# Patient Record
Sex: FEMALE | Race: WHITE | NOT HISPANIC OR LATINO | Employment: FULL TIME | URBAN - METROPOLITAN AREA
[De-identification: names, ages, dates, MRNs, and addresses within clinical notes are randomized per-mention and may not be internally consistent; named-entity substitution may affect disease eponyms.]

---

## 2023-04-27 ENCOUNTER — PATIENT OUTREACH (OUTPATIENT)
Dept: CARE COORDINATION | Facility: CLINIC | Age: 63
End: 2023-04-27
Payer: COMMERCIAL

## 2023-05-26 ENCOUNTER — DOCUMENTATION (OUTPATIENT)
Dept: CARE COORDINATION | Facility: CLINIC | Age: 63
End: 2023-05-26
Payer: COMMERCIAL

## 2023-05-26 LAB
ALANINE AMINOTRANSFERASE (SGPT) (U/L) IN SER/PLAS: 18 U/L (ref 7–45)
ALBUMIN (G/DL) IN SER/PLAS: 4.3 G/DL (ref 3.4–5)
ALKALINE PHOSPHATASE (U/L) IN SER/PLAS: 69 U/L (ref 33–136)
ANION GAP IN SER/PLAS: 12 MMOL/L (ref 10–20)
ASPARTATE AMINOTRANSFERASE (SGOT) (U/L) IN SER/PLAS: 21 U/L (ref 9–39)
BASOPHILS (10*3/UL) IN BLOOD BY AUTOMATED COUNT: 0.03 X10E9/L (ref 0–0.1)
BASOPHILS/100 LEUKOCYTES IN BLOOD BY AUTOMATED COUNT: 0.6 % (ref 0–2)
BILIRUBIN TOTAL (MG/DL) IN SER/PLAS: 0.7 MG/DL (ref 0–1.2)
CALCIUM (MG/DL) IN SER/PLAS: 9.3 MG/DL (ref 8.6–10.3)
CARBON DIOXIDE, TOTAL (MMOL/L) IN SER/PLAS: 28 MMOL/L (ref 21–32)
CHLORIDE (MMOL/L) IN SER/PLAS: 105 MMOL/L (ref 98–107)
CHOLESTEROL (MG/DL) IN SER/PLAS: 197 MG/DL (ref 0–199)
CHOLESTEROL IN HDL (MG/DL) IN SER/PLAS: 48.5 MG/DL
CHOLESTEROL/HDL RATIO: 4.1
COBALAMIN (VITAMIN B12) (PG/ML) IN SER/PLAS: 818 PG/ML (ref 211–911)
CREATININE (MG/DL) IN SER/PLAS: 0.78 MG/DL (ref 0.5–1.05)
EOSINOPHILS (10*3/UL) IN BLOOD BY AUTOMATED COUNT: 0.28 X10E9/L (ref 0–0.7)
EOSINOPHILS/100 LEUKOCYTES IN BLOOD BY AUTOMATED COUNT: 5.7 % (ref 0–6)
ERYTHROCYTE DISTRIBUTION WIDTH (RATIO) BY AUTOMATED COUNT: 15.8 % (ref 11.5–14.5)
ERYTHROCYTE MEAN CORPUSCULAR HEMOGLOBIN CONCENTRATION (G/DL) BY AUTOMATED: 31.7 G/DL (ref 32–36)
ERYTHROCYTE MEAN CORPUSCULAR VOLUME (FL) BY AUTOMATED COUNT: 94 FL (ref 80–100)
ERYTHROCYTES (10*6/UL) IN BLOOD BY AUTOMATED COUNT: 3.95 X10E12/L (ref 4–5.2)
FIBRIN D-DIMER (NG/ML FEU) IN PLATELET POOR PLASMA: 416 NG/ML FEU
GFR FEMALE: 87 ML/MIN/1.73M2
GLUCOSE (MG/DL) IN SER/PLAS: 96 MG/DL (ref 74–99)
HEMATOCRIT (%) IN BLOOD BY AUTOMATED COUNT: 37.2 % (ref 36–46)
HEMOGLOBIN (G/DL) IN BLOOD: 11.8 G/DL (ref 12–16)
IMMATURE GRANULOCYTES/100 LEUKOCYTES IN BLOOD BY AUTOMATED COUNT: 0.2 % (ref 0–0.9)
LDL: 127 MG/DL (ref 0–99)
LEUKOCYTES (10*3/UL) IN BLOOD BY AUTOMATED COUNT: 5 X10E9/L (ref 4.4–11.3)
LYMPHOCYTES (10*3/UL) IN BLOOD BY AUTOMATED COUNT: 1.34 X10E9/L (ref 1.2–4.8)
LYMPHOCYTES/100 LEUKOCYTES IN BLOOD BY AUTOMATED COUNT: 27.1 % (ref 13–44)
MONOCYTES (10*3/UL) IN BLOOD BY AUTOMATED COUNT: 0.33 X10E9/L (ref 0.1–1)
MONOCYTES/100 LEUKOCYTES IN BLOOD BY AUTOMATED COUNT: 6.7 % (ref 2–10)
NEUTROPHILS (10*3/UL) IN BLOOD BY AUTOMATED COUNT: 2.96 X10E9/L (ref 1.2–7.7)
NEUTROPHILS/100 LEUKOCYTES IN BLOOD BY AUTOMATED COUNT: 59.7 % (ref 40–80)
PLATELETS (10*3/UL) IN BLOOD AUTOMATED COUNT: 152 X10E9/L (ref 150–450)
POTASSIUM (MMOL/L) IN SER/PLAS: 3.7 MMOL/L (ref 3.5–5.3)
PROTEIN TOTAL: 7.2 G/DL (ref 6.4–8.2)
SODIUM (MMOL/L) IN SER/PLAS: 141 MMOL/L (ref 136–145)
THYROTROPIN (MIU/L) IN SER/PLAS BY DETECTION LIMIT <= 0.05 MIU/L: 2.25 MIU/L (ref 0.44–3.98)
TRIGLYCERIDE (MG/DL) IN SER/PLAS: 107 MG/DL (ref 0–149)
URATE (MG/DL) IN SER/PLAS: 3.8 MG/DL (ref 2.3–6.7)
UREA NITROGEN (MG/DL) IN SER/PLAS: 23 MG/DL (ref 6–23)
VLDL: 21 MG/DL (ref 0–40)

## 2023-06-24 LAB
APPEARANCE, URINE: NORMAL
ASCORBIC ACID: NORMAL MG/DL
BILIRUBIN, URINE: NORMAL
BLOOD, URINE: NORMAL
COLOR, URINE: NORMAL
GLUCOSE, URINE: NORMAL
KETONES, URINE: NORMAL
LEUKOCYTE ESTERASE, URINE: NORMAL
NITRITE, URINE: NORMAL
PH, URINE: NORMAL
PROTEIN, URINE: NORMAL
SPECIFIC GRAVITY, URINE: NORMAL
UROBILINOGEN, URINE: NORMAL

## 2023-06-25 LAB — URINE CULTURE: ABNORMAL

## 2023-06-26 ENCOUNTER — PATIENT OUTREACH (OUTPATIENT)
Dept: CARE COORDINATION | Facility: CLINIC | Age: 63
End: 2023-06-26
Payer: COMMERCIAL

## 2023-06-26 NOTE — PROGRESS NOTES
Call placed and VMM left.  Purpose of call was to follow up following initial assessment,  Unable to determine if Melany has followed up with her PCP or Cardiologist due to providers not on the EMR or AEMR platforms

## 2023-07-01 LAB — URINE CULTURE: ABNORMAL

## 2023-08-17 ENCOUNTER — HOSPITAL ENCOUNTER (OUTPATIENT)
Dept: DATA CONVERSION | Facility: HOSPITAL | Age: 63
Discharge: HOME | End: 2023-08-17
Payer: COMMERCIAL

## 2023-08-17 DIAGNOSIS — T78.40XA ALLERGY, UNSPECIFIED, INITIAL ENCOUNTER: ICD-10-CM

## 2023-08-17 DIAGNOSIS — L50.9 URTICARIA, UNSPECIFIED: ICD-10-CM

## 2023-08-17 DIAGNOSIS — T36.0X5A ADVERSE EFFECT OF PENICILLINS, INITIAL ENCOUNTER: ICD-10-CM

## 2023-09-05 ENCOUNTER — HOSPITAL ENCOUNTER (OUTPATIENT)
Dept: DATA CONVERSION | Facility: HOSPITAL | Age: 63
Discharge: HOME | End: 2023-09-05
Payer: COMMERCIAL

## 2023-09-05 DIAGNOSIS — N20.0 CALCULUS OF KIDNEY: ICD-10-CM

## 2023-10-19 ENCOUNTER — PHARMACY VISIT (OUTPATIENT)
Dept: PHARMACY | Facility: CLINIC | Age: 63
End: 2023-10-19
Payer: COMMERCIAL

## 2023-10-19 PROCEDURE — RXMED WILLOW AMBULATORY MEDICATION CHARGE

## 2023-10-19 RX ORDER — CLINDAMYCIN HYDROCHLORIDE 150 MG/1
150 CAPSULE ORAL
Qty: 8 CAPSULE | Refills: 2 | OUTPATIENT
Start: 2023-10-19 | End: 2023-11-28 | Stop reason: WASHOUT

## 2023-11-03 ENCOUNTER — TELEPHONE (OUTPATIENT)
Dept: PRIMARY CARE | Facility: CLINIC | Age: 63
End: 2023-11-03
Payer: COMMERCIAL

## 2023-11-03 DIAGNOSIS — F51.01 PRIMARY INSOMNIA: ICD-10-CM

## 2023-11-03 DIAGNOSIS — R11.0 NAUSEA: Primary | ICD-10-CM

## 2023-11-03 RX ORDER — ZOLPIDEM TARTRATE 10 MG/1
10 TABLET ORAL NIGHTLY PRN
Qty: 30 TABLET | Refills: 2 | Status: SHIPPED | OUTPATIENT
Start: 2023-11-03

## 2023-11-03 RX ORDER — ZOLPIDEM TARTRATE 10 MG/1
10 TABLET ORAL NIGHTLY PRN
COMMUNITY
Start: 2021-09-04 | End: 2023-11-03 | Stop reason: SDUPTHER

## 2023-11-03 RX ORDER — GLIMEPIRIDE 1 MG/1
1 TABLET ORAL
COMMUNITY
Start: 2021-03-04 | End: 2023-11-28 | Stop reason: WASHOUT

## 2023-11-03 RX ORDER — PROCHLORPERAZINE MALEATE 10 MG
10 TABLET ORAL EVERY 6 HOURS PRN
Qty: 30 TABLET | Refills: 5 | Status: SHIPPED | OUTPATIENT
Start: 2023-11-03

## 2023-11-03 NOTE — TELEPHONE ENCOUNTER
Pt called requesting refill Ambien and prochlorperazine ([facility]).  Pt last appt 4/4

## 2023-11-28 ENCOUNTER — OFFICE VISIT (OUTPATIENT)
Dept: PRIMARY CARE | Facility: CLINIC | Age: 63
End: 2023-11-28
Payer: COMMERCIAL

## 2023-11-28 VITALS
HEIGHT: 68 IN | HEART RATE: 55 BPM | DIASTOLIC BLOOD PRESSURE: 82 MMHG | BODY MASS INDEX: 34.1 KG/M2 | SYSTOLIC BLOOD PRESSURE: 112 MMHG | WEIGHT: 225 LBS

## 2023-11-28 DIAGNOSIS — R41.3 MEMORY LOSS: Primary | ICD-10-CM

## 2023-11-28 DIAGNOSIS — E11.9 TYPE 2 DIABETES MELLITUS WITHOUT COMPLICATION, UNSPECIFIED WHETHER LONG TERM INSULIN USE (MULTI): ICD-10-CM

## 2023-11-28 DIAGNOSIS — E55.9 VITAMIN D DEFICIENCY: ICD-10-CM

## 2023-11-28 PROBLEM — F41.9 ANXIETY: Status: ACTIVE | Noted: 2023-11-28

## 2023-11-28 PROBLEM — R30.0 DYSURIA: Status: RESOLVED | Noted: 2023-11-28 | Resolved: 2023-11-28

## 2023-11-28 PROBLEM — N39.0 ACUTE URINARY TRACT INFECTION: Status: RESOLVED | Noted: 2023-11-28 | Resolved: 2023-11-28

## 2023-11-28 PROBLEM — G47.10 HYPERSOMNOLENCE: Status: ACTIVE | Noted: 2023-11-28

## 2023-11-28 PROBLEM — R16.1 SPLENOMEGALY: Status: ACTIVE | Noted: 2023-11-28

## 2023-11-28 PROBLEM — D64.9 ANEMIA: Status: ACTIVE | Noted: 2023-11-28

## 2023-11-28 PROBLEM — R74.01 ELEVATION OF LEVEL OF TRANSAMINASE AND LACTIC ACID DEHYDROGENASE (LDH): Status: ACTIVE | Noted: 2023-11-28

## 2023-11-28 PROBLEM — K74.60 CIRRHOSIS (MULTI): Status: ACTIVE | Noted: 2023-11-28

## 2023-11-28 PROBLEM — M75.100 ROTATOR CUFF TEAR: Status: RESOLVED | Noted: 2023-11-28 | Resolved: 2023-11-28

## 2023-11-28 PROBLEM — R63.0 LOSS OF APPETITE: Status: RESOLVED | Noted: 2023-11-28 | Resolved: 2023-11-28

## 2023-11-28 PROBLEM — I47.10 SVT (SUPRAVENTRICULAR TACHYCARDIA) (CMS-HCC): Status: ACTIVE | Noted: 2023-11-28

## 2023-11-28 PROBLEM — G89.29 OTHER CHRONIC PAIN: Status: ACTIVE | Noted: 2023-11-28

## 2023-11-28 PROBLEM — R87.810 CERVICAL HIGH RISK HUMAN PAPILLOMAVIRUS (HPV) DNA TEST POSITIVE: Status: ACTIVE | Noted: 2023-11-28

## 2023-11-28 PROBLEM — K21.9 GERD (GASTROESOPHAGEAL REFLUX DISEASE): Status: ACTIVE | Noted: 2023-11-28

## 2023-11-28 PROBLEM — T78.40XA ALLERGIC REACTION: Status: RESOLVED | Noted: 2023-11-28 | Resolved: 2023-11-28

## 2023-11-28 PROBLEM — F51.04 CHRONIC INSOMNIA: Status: ACTIVE | Noted: 2023-11-28

## 2023-11-28 PROBLEM — E78.5 HYPERLIPIDEMIA: Status: ACTIVE | Noted: 2023-11-28

## 2023-11-28 PROBLEM — I48.91 RAPID ATRIAL FIBRILLATION (MULTI): Status: ACTIVE | Noted: 2023-11-28

## 2023-11-28 PROBLEM — G47.26 CIRCADIAN RHYTHM SLEEP DISORDER, SHIFT WORK TYPE: Status: ACTIVE | Noted: 2023-11-28

## 2023-11-28 PROBLEM — R50.9 FEVER: Status: RESOLVED | Noted: 2023-11-28 | Resolved: 2023-11-28

## 2023-11-28 PROBLEM — Z99.89 DEPENDENCE ON OTHER ENABLING MACHINES AND DEVICES: Status: ACTIVE | Noted: 2023-11-28

## 2023-11-28 PROBLEM — T50.905A ADVERSE REACTION TO DRUG: Status: RESOLVED | Noted: 2023-11-28 | Resolved: 2023-11-28

## 2023-11-28 PROBLEM — R74.02 ELEVATION OF LEVEL OF TRANSAMINASE AND LACTIC ACID DEHYDROGENASE (LDH): Status: ACTIVE | Noted: 2023-11-28

## 2023-11-28 PROBLEM — E11.319 DIABETIC RETINOPATHY (MULTI): Status: ACTIVE | Noted: 2023-11-28

## 2023-11-28 PROBLEM — K76.0 FATTY LIVER: Status: ACTIVE | Noted: 2023-11-28

## 2023-11-28 PROBLEM — D50.9 IRON DEFICIENCY ANEMIA: Status: ACTIVE | Noted: 2023-11-28

## 2023-11-28 PROBLEM — R74.8 ELEVATED LIVER ENZYMES: Status: ACTIVE | Noted: 2023-11-28

## 2023-11-28 PROBLEM — R26.89 IMPAIRMENT OF BALANCE: Status: ACTIVE | Noted: 2023-11-28

## 2023-11-28 PROBLEM — M06.09 POLYARTHRITIS WITH NEGATIVE RHEUMATOID FACTOR (MULTI): Status: ACTIVE | Noted: 2023-11-28

## 2023-11-28 PROBLEM — E11.21 DIABETIC NEPHROPATHY (MULTI): Status: ACTIVE | Noted: 2023-11-28

## 2023-11-28 PROBLEM — F51.12 BEHAVIORALLY INDUCED INSUFFICIENT SLEEP SYNDROME: Status: ACTIVE | Noted: 2023-11-28

## 2023-11-28 PROBLEM — I10 HYPERTENSION: Status: ACTIVE | Noted: 2023-11-28

## 2023-11-28 PROBLEM — G47.33 OBSTRUCTIVE SLEEP APNEA (ADULT) (PEDIATRIC): Status: ACTIVE | Noted: 2023-11-28

## 2023-11-28 PROBLEM — R00.2 PALPITATIONS: Status: ACTIVE | Noted: 2023-11-28

## 2023-11-28 PROBLEM — M54.32 SCIATICA OF LEFT SIDE: Status: RESOLVED | Noted: 2023-11-28 | Resolved: 2023-11-28

## 2023-11-28 PROBLEM — G25.81 RESTLESS LEG SYNDROME: Status: ACTIVE | Noted: 2023-11-28

## 2023-11-28 PROBLEM — R10.9 ABDOMINAL PAIN: Status: RESOLVED | Noted: 2023-11-28 | Resolved: 2023-11-28

## 2023-11-28 PROBLEM — M1A.0710 CHRONIC GOUT OF RIGHT FOOT: Status: ACTIVE | Noted: 2023-11-28

## 2023-11-28 LAB — POC HEMOGLOBIN A1C: 6 % (ref 4.2–6.5)

## 2023-11-28 PROCEDURE — 3074F SYST BP LT 130 MM HG: CPT | Performed by: PHYSICIAN ASSISTANT

## 2023-11-28 PROCEDURE — 1036F TOBACCO NON-USER: CPT | Performed by: PHYSICIAN ASSISTANT

## 2023-11-28 PROCEDURE — 4010F ACE/ARB THERAPY RXD/TAKEN: CPT | Performed by: PHYSICIAN ASSISTANT

## 2023-11-28 PROCEDURE — 99203 OFFICE O/P NEW LOW 30 MIN: CPT | Performed by: PHYSICIAN ASSISTANT

## 2023-11-28 PROCEDURE — 3079F DIAST BP 80-89 MM HG: CPT | Performed by: PHYSICIAN ASSISTANT

## 2023-11-28 PROCEDURE — 3044F HG A1C LEVEL LT 7.0%: CPT | Performed by: PHYSICIAN ASSISTANT

## 2023-11-28 PROCEDURE — 83036 HEMOGLOBIN GLYCOSYLATED A1C: CPT | Performed by: PHYSICIAN ASSISTANT

## 2023-11-28 ASSESSMENT — PAIN SCALES - GENERAL: PAINLEVEL: 0-NO PAIN

## 2023-11-28 ASSESSMENT — ENCOUNTER SYMPTOMS: BACK PAIN: 1

## 2023-11-28 NOTE — PROGRESS NOTES
"Subjective   Patient ID: Melany Garcia is a 63 y.o. female who presents for Memory Loss (New patient. Discuss memory issues.) and Back Pain (Ongoing, left sided \"crampy\").  History of cirrhosis  Memory Loss    Patient reports onset of memory loss was more than 5 years ago. Onset quality is gradual.     Symptoms associated with memory loss include changes in short-term memory, changes in long-term memory and difficulty recalling words.     The family and/or patient does not have the following concerns associated with memory loss: cooking or preparing meals.  Back Pain  Pertinent negatives include no chest pain or numbness.    She has had it for years. Has not been evaluated for it for some time.She is a phlebotomist.    Review of Systems   Respiratory:  Negative for chest tightness, shortness of breath and wheezing.    Cardiovascular:  Negative for chest pain.   Musculoskeletal:  Positive for arthralgias, back pain and myalgias.   Neurological:  Negative for syncope, speech difficulty, light-headedness and numbness.   Psychiatric/Behavioral:  Positive for confusion and decreased concentration. The patient is not nervous/anxious.        Objective   /82   Pulse 55   Ht 1.727 m (5' 8\")   Wt 102 kg (225 lb)   BMI 34.21 kg/m²     Physical Exam  Constitutional:       Appearance: She is obese.   HENT:      Right Ear: Tympanic membrane normal.      Left Ear: Tympanic membrane normal.   Eyes:      Extraocular Movements: Extraocular movements intact.      Pupils: Pupils are equal, round, and reactive to light.   Cardiovascular:      Rate and Rhythm: Normal rate.      Pulses: Normal pulses.      Heart sounds: No murmur heard.  Pulmonary:      Effort: Pulmonary effort is normal.   Musculoskeletal:      Cervical back: Normal range of motion. No tenderness.   Skin:     General: Skin is warm.   Neurological:      General: No focal deficit present.      Mental Status: She is alert.      Cranial Nerves: No cranial nerve " deficit.      Sensory: No sensory deficit.      Motor: No weakness.      Coordination: Coordination normal.      Gait: Gait normal.      Deep Tendon Reflexes: Reflexes normal.   Psychiatric:         Mood and Affect: Mood normal.         Judgment: Judgment normal.         Assessment/Plan   Diagnoses and all orders for this visit:  Memory loss  -     CT head wo IV contrast; Future  Type 2 diabetes mellitus without complication, unspecified whether long term insulin use (CMS/[facility])  -     POCT glycosylated hemoglobin (Hb A1C) manually resulted  Vitamin D deficiency  -     Vitamin D 25-Hydroxy,Total (for eval of Vitamin D levels); Future  Other orders  -     Follow Up In Primary Care - Established; Future       Patient will follow-up for recheck of her test results.  May need to be referred to neurology.

## 2023-11-30 ASSESSMENT — ENCOUNTER SYMPTOMS
ARTHRALGIAS: 1
WHEEZING: 0
NUMBNESS: 0
LIGHT-HEADEDNESS: 0
MYALGIAS: 1
SPEECH DIFFICULTY: 0
CONFUSION: 1
CHEST TIGHTNESS: 0
NERVOUS/ANXIOUS: 0
DECREASED CONCENTRATION: 1
SHORTNESS OF BREATH: 0

## 2023-12-18 ENCOUNTER — HOSPITAL ENCOUNTER (OUTPATIENT)
Dept: RADIOLOGY | Facility: HOSPITAL | Age: 63
Discharge: HOME | End: 2023-12-18
Payer: COMMERCIAL

## 2023-12-18 DIAGNOSIS — R41.3 MEMORY LOSS: ICD-10-CM

## 2023-12-18 PROCEDURE — 70450 CT HEAD/BRAIN W/O DYE: CPT | Performed by: RADIOLOGY

## 2023-12-18 PROCEDURE — 70450 CT HEAD/BRAIN W/O DYE: CPT

## 2023-12-22 PROCEDURE — RXMED WILLOW AMBULATORY MEDICATION CHARGE

## 2024-01-02 ENCOUNTER — OFFICE VISIT (OUTPATIENT)
Dept: PRIMARY CARE | Facility: CLINIC | Age: 64
End: 2024-01-02
Payer: COMMERCIAL

## 2024-01-02 VITALS
WEIGHT: 225 LBS | BODY MASS INDEX: 34.21 KG/M2 | HEART RATE: 74 BPM | DIASTOLIC BLOOD PRESSURE: 72 MMHG | SYSTOLIC BLOOD PRESSURE: 108 MMHG

## 2024-01-02 DIAGNOSIS — Z12.31 ENCOUNTER FOR SCREENING MAMMOGRAM FOR MALIGNANT NEOPLASM OF BREAST: Primary | ICD-10-CM

## 2024-01-02 DIAGNOSIS — E55.9 VITAMIN D DEFICIENCY: ICD-10-CM

## 2024-01-02 DIAGNOSIS — R41.3 MEMORY LOSS: ICD-10-CM

## 2024-01-02 PROCEDURE — 3074F SYST BP LT 130 MM HG: CPT | Performed by: PHYSICIAN ASSISTANT

## 2024-01-02 PROCEDURE — 99213 OFFICE O/P EST LOW 20 MIN: CPT | Performed by: PHYSICIAN ASSISTANT

## 2024-01-02 PROCEDURE — 1036F TOBACCO NON-USER: CPT | Performed by: PHYSICIAN ASSISTANT

## 2024-01-02 PROCEDURE — 4010F ACE/ARB THERAPY RXD/TAKEN: CPT | Performed by: PHYSICIAN ASSISTANT

## 2024-01-02 PROCEDURE — 3078F DIAST BP <80 MM HG: CPT | Performed by: PHYSICIAN ASSISTANT

## 2024-01-02 ASSESSMENT — PAIN SCALES - GENERAL: PAINLEVEL: 0-NO PAIN

## 2024-01-02 NOTE — PROGRESS NOTES
Subjective   Patient ID: Melany Garcia is a 63 y.o. female who presents for Follow-up (CT scan. Discuss other issues such as hair loss, possible vitamin deficiency. ).    HPI   CT scan of the brain was negative.  She has not noticed any changes in memory or other episodes.Turns out her vitamin D has been low in the past.  She is taking vitamin D daily  Review of Systems   Constitutional:  Positive for fatigue.   Endocrine: Negative for cold intolerance and heat intolerance.   Musculoskeletal:  Positive for arthralgias and myalgias.   Psychiatric/Behavioral:  Negative for confusion.        Objective   /72   Pulse 74   Wt 102 kg (225 lb)   BMI 34.21 kg/m²     Physical Exam  Constitutional:       Appearance: Normal appearance.   Neurological:      General: No focal deficit present.      Mental Status: She is alert. Mental status is at baseline.   Psychiatric:         Mood and Affect: Mood normal.         Thought Content: Thought content normal.         Judgment: Judgment normal.         Assessment/Plan   Diagnoses and all orders for this visit:  Encounter for screening mammogram for malignant neoplasm of breast  -     BI mammo bilateral screening tomosynthesis; Future  -     BI mammo bilateral screening tomosynthesis; Future  Memory loss  Vitamin D deficiency  Other orders  -     Follow Up In Primary Care - Established  -     Follow Up In Primary Care - Health Maintenance; Future  Monitor for now.  Continue vitamin D rest of her vitamins.

## 2024-01-03 ENCOUNTER — ANCILLARY PROCEDURE (OUTPATIENT)
Dept: RADIOLOGY | Facility: CLINIC | Age: 64
End: 2024-01-03
Payer: COMMERCIAL

## 2024-01-03 VITALS — BODY MASS INDEX: 33.71 KG/M2 | HEIGHT: 69 IN

## 2024-01-03 DIAGNOSIS — Z12.31 ENCOUNTER FOR SCREENING MAMMOGRAM FOR MALIGNANT NEOPLASM OF BREAST: ICD-10-CM

## 2024-01-03 PROCEDURE — 77067 SCR MAMMO BI INCL CAD: CPT

## 2024-01-03 PROCEDURE — RXMED WILLOW AMBULATORY MEDICATION CHARGE

## 2024-01-03 ASSESSMENT — ENCOUNTER SYMPTOMS
FATIGUE: 1
MYALGIAS: 1
ARTHRALGIAS: 1
CONFUSION: 0

## 2024-01-04 ENCOUNTER — OFFICE VISIT (OUTPATIENT)
Dept: UROLOGY | Facility: CLINIC | Age: 64
End: 2024-01-04
Payer: COMMERCIAL

## 2024-01-04 DIAGNOSIS — N32.81 OAB (OVERACTIVE BLADDER): Primary | ICD-10-CM

## 2024-01-04 LAB
POC APPEARANCE, URINE: CLEAR
POC BILIRUBIN, URINE: NEGATIVE
POC BLOOD, URINE: NEGATIVE
POC COLOR, URINE: YELLOW
POC GLUCOSE, URINE: NEGATIVE MG/DL
POC KETONES, URINE: NEGATIVE MG/DL
POC LEUKOCYTES, URINE: NEGATIVE
POC NITRITE,URINE: NEGATIVE
POC PH, URINE: 6 PH
POC PROTEIN, URINE: NEGATIVE MG/DL
POC SPECIFIC GRAVITY, URINE: >=1.03
POC UROBILINOGEN, URINE: 0.2 EU/DL

## 2024-01-04 PROCEDURE — 99205 OFFICE O/P NEW HI 60 MIN: CPT | Performed by: STUDENT IN AN ORGANIZED HEALTH CARE EDUCATION/TRAINING PROGRAM

## 2024-01-04 PROCEDURE — 1036F TOBACCO NON-USER: CPT | Performed by: STUDENT IN AN ORGANIZED HEALTH CARE EDUCATION/TRAINING PROGRAM

## 2024-01-04 PROCEDURE — 4010F ACE/ARB THERAPY RXD/TAKEN: CPT | Performed by: STUDENT IN AN ORGANIZED HEALTH CARE EDUCATION/TRAINING PROGRAM

## 2024-01-04 PROCEDURE — 51798 US URINE CAPACITY MEASURE: CPT | Performed by: STUDENT IN AN ORGANIZED HEALTH CARE EDUCATION/TRAINING PROGRAM

## 2024-01-04 NOTE — PROGRESS NOTES
Referred by: Nadine Holder    PCP  Nadine Holder PA-C         CHIEF COMPLAINT:  Urinary incontinence         HISTORY OF PRESENT ILLNESS:  This is a  63 y.o. y.o. who presents with urinary incontinence    Previously saw Dr. Robledo and had a sling placed in 2000. She does not believe this really helped with her leaking. She is having urgency, and not able to make it to the bathroom every time. She is also having issues with nocturia. She feels that she empties her bladder well. She does not strain to void. She has tried at least two medications in the past to help with her symptoms, VesiCare and oxybutynin being the only names she can recall.     Hysterectomy for AUB. 3 vaginal births. Concerning lumps recently found on mammogram.          Past Medical History  She has a past medical history of Acute urinary tract infection (11/28/2023), Adverse reaction to drug (11/28/2023), Allergic reaction (11/28/2023), Ankylosing spondylitis (CMS/[facility]), Fever (11/28/2023), and Rotator cuff tear (11/28/2023).    Surgical History  She has a past surgical history that includes Joint replacement (Bilateral); Hysterectomy; Rotator cuff repair (Left); Colonoscopy (2018); and Knee Arthroplasty (Right).     Social History  She reports that she has never smoked. She has never used smokeless tobacco. She reports that she does not drink alcohol and does not use drugs.    Family History  Family History   Problem Relation Name Age of Onset    Breast cancer Sister          Allergies  Amoxicillin, Articaine-epinephrine bitart, and Benzocaine        A comprehensive 10+ review of systems was negative except for: see hpi                    PHYSICAL EXAMINATION:  BP Readings from Last 3 Encounters:   01/02/24 108/72   11/28/23 112/82   04/04/23 122/76      Wt Readings from Last 3 Encounters:   01/02/24 102 kg (225 lb)   11/28/23 102 kg (225 lb)   04/04/23 93.9 kg (207 lb)      BMI: Estimated body mass index is 33.71 kg/m² as calculated from  "the following:    Height as of 1/3/24: 1.74 m (5' 8.5\").    Weight as of 1/2/24: 102 kg (225 lb).  BSA: Estimated body surface area is 2.22 meters squared as calculated from the following:    Height as of 1/3/24: 1.74 m (5' 8.5\").    Weight as of 1/2/24: 102 kg (225 lb).  HEENT: Normocephalic, atraumatic, PER EOMI, nonicteric, trachea normal, thyroid normal, oropharynx normal.  CARDIAC: regular rate & rhythm, S1 & S2 normal.  No heaves, thrills, gallops or murmurs.  LUNGS: Clear to auscultation, no spinal or CV tenderness.  EXTREMITIES: No evidence of cyanosis, clubbing or edema.      Pelvic:  Genitourinary:  normal external genitalia, Bartholin's glands negative, Fords's glands negative  Urethra   normal meatus, non-tender, no periurethral mass  Vaginal mucosa  normal    Adnexae  negative nontender, no masses  Atrophy positive    CST negative  Pelvic floor muscle contraction  4/5    POP-Q (in supine position):   Stage 0 POP     Rectal: no hemorrhoids, fissures or masses    PVR (by Ultrasound): 12           IMPRESSION AND PLAN:  Melany Garcia is a 63 y.o. who presents with OAB    -has failed multiple AC meds including vesicare and oxybutynin   -schedule Urodynamics  -we discussed botox vs sacral neuromodulation: both have similar efficacy 80% patients reports >50% improvement, botox associated with 5% risk of incomplete emptying, increase in UTI and will require re-injection in 6-9 months; and as early as 3 months. SNM is a staged procedure, 2 weeks apart, consisting first of lead implantation then internalization of IPG if there is improvement. Interstim is associated with lead migration, explantation, infection and bleeding, though risks are all <5%. We also discussed PTNS which is associated with success rates comparable to medical therapy but without side-effects without significant major morbidity.       All questions and concerns were answered and addressed.  The patient expressed understanding and agrees " with the plan.     Follow up will be scheduled appropriately.     1/4/2024      Scribe Attestation  By signing my name below, I, Shasta Rebolledo   attest that this documentation has been prepared under the direction and in the presence of Michael Campos MD.

## 2024-01-04 NOTE — LETTER
January 5, 2024     Nadine Holder PA-C  [address] [place] [facility]  [address]  [place] [address]    Patient: Melany Garcia   YOB: 1960   Date of Visit: 1/4/2024       Dear Dr. Nadine Holder PA-C:    Thank you for referring Melany Garcia to me for evaluation. Below are my notes for this consultation.  If you have questions, please do not hesitate to call me. I look forward to following your patient along with you.       Sincerely,     Michael Campos MD      CC: No Recipients  ______________________________________________________________________________________    Referred by: Nadine Holder    PCP  Nadine Holder PA-C         CHIEF COMPLAINT:  Urinary incontinence         HISTORY OF PRESENT ILLNESS:  This is a  63 y.o. y.o. who presents with urinary incontinence    Previously saw Dr. Robledo and had a sling placed in 2000. She does not believe this really helped with her leaking. She is having urgency, and not able to make it to the bathroom every time. She is also having issues with nocturia. She feels that she empties her bladder well. She does not strain to void. She has tried at least two medications in the past to help with her symptoms, VesiCare and oxybutynin being the only names she can recall.     Hysterectomy for AUB. 3 vaginal births. Concerning lumps recently found on mammogram.          Past Medical History  She has a past medical history of Acute urinary tract infection (11/28/2023), Adverse reaction to drug (11/28/2023), Allergic reaction (11/28/2023), Ankylosing spondylitis (CMS/HCC), Fever (11/28/2023), and Rotator cuff tear (11/28/2023).    Surgical History  She has a past surgical history that includes Joint replacement (Bilateral); Hysterectomy; Rotator cuff repair (Left); Colonoscopy (2018); and Knee Arthroplasty (Right).     Social History  She reports that she has never smoked. She has never used smokeless tobacco. She reports that she does not  "drink alcohol and does not use drugs.    Family History  Family History   Problem Relation Name Age of Onset   • Breast cancer Sister          Allergies  Amoxicillin, Articaine-epinephrine bitart, and Benzocaine        A comprehensive 10+ review of systems was negative except for: see hpi                    PHYSICAL EXAMINATION:  BP Readings from Last 3 Encounters:   01/02/24 108/72   11/28/23 112/82   04/04/23 122/76      Wt Readings from Last 3 Encounters:   01/02/24 102 kg (225 lb)   11/28/23 102 kg (225 lb)   04/04/23 93.9 kg (207 lb)      BMI: Estimated body mass index is 33.71 kg/m² as calculated from the following:    Height as of 1/3/24: 1.74 m (5' 8.5\").    Weight as of 1/2/24: 102 kg (225 lb).  BSA: Estimated body surface area is 2.22 meters squared as calculated from the following:    Height as of 1/3/24: 1.74 m (5' 8.5\").    Weight as of 1/2/24: 102 kg (225 lb).  HEENT: Normocephalic, atraumatic, PER EOMI, nonicteric, trachea normal, thyroid normal, oropharynx normal.  CARDIAC: regular rate & rhythm, S1 & S2 normal.  No heaves, thrills, gallops or murmurs.  LUNGS: Clear to auscultation, no spinal or CV tenderness.  EXTREMITIES: No evidence of cyanosis, clubbing or edema.      Pelvic:  Genitourinary:  normal external genitalia, Bartholin's glands negative, [place]'s glands negative  Urethra   normal meatus, non-tender, no periurethral mass  Vaginal mucosa  normal    Adnexae  negative nontender, no masses  Atrophy positive    CST negative  Pelvic floor muscle contraction  4/5    POP-Q (in supine position):   Stage 0 POP     Rectal: no hemorrhoids, fissures or masses    PVR (by Ultrasound): 12           IMPRESSION AND PLAN:  Melany Garcia is a 63 y.o. who presents with OAB    -has failed multiple AC meds including vesicare and oxybutynin   -schedule Urodynamics  -we discussed botox vs sacral neuromodulation: both have similar efficacy 80% patients reports >50% improvement, botox associated with 5% risk of " incomplete emptying, increase in UTI and will require re-injection in 6-9 months; and as early as 3 months. SNM is a staged procedure, 2 weeks apart, consisting first of lead implantation then internalization of IPG if there is improvement. Interstim is associated with lead migration, explantation, infection and bleeding, though risks are all <5%. We also discussed PTNS which is associated with success rates comparable to medical therapy but without side-effects without significant major morbidity.       All questions and concerns were answered and addressed.  The patient expressed understanding and agrees with the plan.     Follow up will be scheduled appropriately.     1/4/2024      Scribe Attestation  By signing my name below, IDenise Scribe   attest that this documentation has been prepared under the direction and in the presence of Michael Campos MD.

## 2024-01-05 ENCOUNTER — PHARMACY VISIT (OUTPATIENT)
Dept: PHARMACY | Facility: CLINIC | Age: 64
End: 2024-01-05
Payer: COMMERCIAL

## 2024-01-08 ENCOUNTER — HOSPITAL ENCOUNTER (OUTPATIENT)
Dept: RADIOLOGY | Facility: HOSPITAL | Age: 64
Discharge: HOME | End: 2024-01-08
Payer: COMMERCIAL

## 2024-01-08 DIAGNOSIS — R92.8 OTHER ABNORMAL AND INCONCLUSIVE FINDINGS ON DIAGNOSTIC IMAGING OF BREAST: ICD-10-CM

## 2024-01-08 PROCEDURE — 77061 BREAST TOMOSYNTHESIS UNI: CPT | Mod: LT

## 2024-01-08 PROCEDURE — 76982 USE 1ST TARGET LESION: CPT | Mod: LT

## 2024-01-08 PROCEDURE — 76642 ULTRASOUND BREAST LIMITED: CPT | Mod: LT

## 2024-01-09 DIAGNOSIS — N60.02 BREAST CYST, LEFT: Primary | ICD-10-CM

## 2024-01-09 PROBLEM — G47.33 OBSTRUCTIVE SLEEP APNEA SYNDROME: Status: ACTIVE | Noted: 2023-04-25

## 2024-01-09 PROBLEM — N32.81 OVERACTIVE BLADDER: Status: ACTIVE | Noted: 2024-01-09

## 2024-01-09 PROBLEM — K76.0 STEATOSIS OF LIVER: Status: ACTIVE | Noted: 2023-04-25

## 2024-01-09 PROBLEM — Z99.89 DEPENDENCE ON ENABLING MACHINE: Status: ACTIVE | Noted: 2023-04-25

## 2024-01-09 PROBLEM — I47.10 SUPRAVENTRICULAR TACHYCARDIA (CMS-HCC): Status: ACTIVE | Noted: 2023-04-25

## 2024-01-09 PROBLEM — K21.9 GASTROESOPHAGEAL REFLUX DISEASE: Status: ACTIVE | Noted: 2022-08-09

## 2024-01-09 PROBLEM — D64.9 ANEMIA: Status: ACTIVE | Noted: 2022-08-09

## 2024-01-09 PROBLEM — M13.0 POLYARTHROPATHY: Status: ACTIVE | Noted: 2023-11-28

## 2024-01-09 PROCEDURE — RXMED WILLOW AMBULATORY MEDICATION CHARGE

## 2024-01-10 ENCOUNTER — LAB REQUISITION (OUTPATIENT)
Dept: LAB | Facility: HOSPITAL | Age: 64
End: 2024-01-10
Payer: COMMERCIAL

## 2024-01-10 DIAGNOSIS — E55.9 VITAMIN D DEFICIENCY, UNSPECIFIED: ICD-10-CM

## 2024-01-10 LAB — 25(OH)D3 SERPL-MCNC: 35 NG/ML (ref 30–100)

## 2024-01-10 PROCEDURE — 82306 VITAMIN D 25 HYDROXY: CPT

## 2024-01-12 PROCEDURE — RXMED WILLOW AMBULATORY MEDICATION CHARGE

## 2024-01-15 ENCOUNTER — PHARMACY VISIT (OUTPATIENT)
Dept: PHARMACY | Facility: CLINIC | Age: 64
End: 2024-01-15
Payer: COMMERCIAL

## 2024-01-22 PROCEDURE — RXMED WILLOW AMBULATORY MEDICATION CHARGE

## 2024-01-23 ENCOUNTER — PHARMACY VISIT (OUTPATIENT)
Dept: PHARMACY | Facility: CLINIC | Age: 64
End: 2024-01-23
Payer: COMMERCIAL

## 2024-01-23 PROCEDURE — RXMED WILLOW AMBULATORY MEDICATION CHARGE

## 2024-01-24 ENCOUNTER — APPOINTMENT (OUTPATIENT)
Dept: OTOLARYNGOLOGY | Facility: CLINIC | Age: 64
End: 2024-01-24
Payer: COMMERCIAL

## 2024-01-24 ENCOUNTER — LAB REQUISITION (OUTPATIENT)
Dept: LAB | Facility: HOSPITAL | Age: 64
End: 2024-01-24
Payer: COMMERCIAL

## 2024-01-24 DIAGNOSIS — I10 ESSENTIAL (PRIMARY) HYPERTENSION: ICD-10-CM

## 2024-01-24 LAB
CHOLEST SERPL-MCNC: 213 MG/DL (ref 0–199)
CHOLESTEROL/HDL RATIO: 4.6
HDLC SERPL-MCNC: 46.1 MG/DL
LDLC SERPL CALC-MCNC: 141 MG/DL
NON HDL CHOLESTEROL: 167 MG/DL (ref 0–149)
TRIGL SERPL-MCNC: 129 MG/DL (ref 0–149)
VLDL: 26 MG/DL (ref 0–40)

## 2024-01-24 PROCEDURE — 80061 LIPID PANEL: CPT

## 2024-01-26 ENCOUNTER — OFFICE VISIT (OUTPATIENT)
Dept: OTOLARYNGOLOGY | Facility: CLINIC | Age: 64
End: 2024-01-26
Payer: COMMERCIAL

## 2024-01-26 VITALS — BODY MASS INDEX: 34.36 KG/M2 | WEIGHT: 232 LBS | TEMPERATURE: 97.3 F | HEIGHT: 69 IN

## 2024-01-26 DIAGNOSIS — G47.33 OBSTRUCTIVE SLEEP APNEA: Primary | ICD-10-CM

## 2024-01-26 PROCEDURE — 99204 OFFICE O/P NEW MOD 45 MIN: CPT | Performed by: OTOLARYNGOLOGY

## 2024-01-26 PROCEDURE — 3050F LDL-C >= 130 MG/DL: CPT | Performed by: OTOLARYNGOLOGY

## 2024-01-26 PROCEDURE — 1036F TOBACCO NON-USER: CPT | Performed by: OTOLARYNGOLOGY

## 2024-01-26 PROCEDURE — 4010F ACE/ARB THERAPY RXD/TAKEN: CPT | Performed by: OTOLARYNGOLOGY

## 2024-01-26 ASSESSMENT — PATIENT HEALTH QUESTIONNAIRE - PHQ9
1. LITTLE INTEREST OR PLEASURE IN DOING THINGS: NOT AT ALL
2. FEELING DOWN, DEPRESSED OR HOPELESS: NOT AT ALL
SUM OF ALL RESPONSES TO PHQ9 QUESTIONS 1 & 2: 0

## 2024-01-26 NOTE — PROGRESS NOTES
No chief complaint on file.     Date of Evaluation: 1/26/2024   HPI  Melany Garcia is a 63 y.o. female here for evaluation of obstructive sleep apnea.  She was originally diagnosed with sleep apnea in 2009 and found to have an RDI of 18 with a treatment CPAP pressure of 8 cm.  She has used CPAP through the years but remains feeling very tired.  She feels as though the CPAP is not taking care of her issues.  In 2018 she had an MSLT that showed hypersomnolence.  When she uses CPAP at night she feels as though she needs to keep the straps very tight because she is a light sleeper and any air leak quickly awakens her.  This is happening through the night.       Past Medical History:   Diagnosis Date    Acute urinary tract infection 11/28/2023    Adverse reaction to drug 11/28/2023    Allergic reaction 11/28/2023    Ankylosing spondylitis (CMS/HCC)     Fever 11/28/2023    Rotator cuff tear 11/28/2023      Past Surgical History:   Procedure Laterality Date    COLONOSCOPY  2018    HYSTERECTOMY      JOINT REPLACEMENT Bilateral     hips    KNEE ARTHROPLASTY Right     ROTATOR CUFF REPAIR Left           Medications:   Current Outpatient Medications   Medication Instructions    aspirin 81 mg chewable tablet CHEW 1 TABLET BY MOUTH ONCE DAILY    clindamycin (Cleocin) 300 mg capsule take 2 capsules by oral route 1 hour prior to procedure and 1 capsule by oral route 6 hours post procedure. save remainder for future appointments    EPINEPHrine (Epipen) 0.3 mg/0.3 mL injection syringe INJECT 1 SYRINGE INTRAMUSCULARLY AS NEEDED FOR SHORTNESS OF BREATH    losartan (Cozaar) 100 mg tablet 1 tablet Orally Once a day    metoprolol succinate XL (Toprol-XL) 25 mg 24 hr tablet 1 tablet Orally Once a day 90 days    omega-3 fatty acids-fish oil (Fish OiL) 360-1,200 mg capsule take 1 capsule Orally twice a day (bid) 90 days    prochlorperazine (COMPAZINE) 10 mg, oral, Every 6 hours PRN    zolpidem (AMBIEN) 10 mg, oral, Nightly PRN     "    Allergies:  Allergies   Allergen Reactions    Amoxicillin Itching and Shortness of breath    Articaine-Epinephrine Bitart Itching and Shortness of breath    Benzocaine Itching and Shortness of breath    Epinephrine Other        Physical Exam:  Last Recorded Vitals  Temperature 36.3 °C (97.3 °F), height 1.74 m (5' 8.5\"), weight 105 kg (232 lb).  []General appearance: Well-developed, well-nourished in no acute distress, conversant with normal voice quality    Head/face: No erythema or edema or facial tenderness, and normal facial nerve function bilaterally    External ear: Clear external auditory canals with normal pinnae  Tube status: N/A  Middle ear: Tympanic membranes intact and mobile, middle ears normal.  Tympanic membrane perforation: N/A  Mastoid bowl: N/A  Hearing: Normal conversational awareness at normal speech thresholds    Nose visualized using: Anterior rhinoscopy  Nasal dorsum: Nontraumatic midline appearance  Septum: Midline, nonobstructing  Inferior turbinates: Normal, pink  Secretions: Dry    Oral cavity and oropharynx: Normal  Teeth: Good condition  Floor of mouth: without lesions  Palate: Normal hard palate, soft palate and uvula  Oropharynx: Clear, no lesions present  Buccal mucosa: Normal without masses or lesions  Lips: Normal    Nasopharynx: Inadequate mirror exam secondary to gag/anatomy    Neck:  Salivary glands: Normal bilateral parotid and submandibular glands by inspection and palpation.  Non-thyroid masses: No palpable masses or significant lymphadenopathy  Trachea: Midline  Thyroid: No thyromegaly or palpable nodules  Temporomandibular joint: Nontender  Cervical range of motion: Normal    Neurologic exam: Alert and oriented x3, appropriate affect.  Cranial nerves II-XII normal bilaterally  Extraocular movement: Extraocular movement intact, normal gaze alignment        Diagnoses and all orders for this visit:  Obstructive sleep apnea (Primary)  -     In-Center Sleep Study; Future   "     PLAN  We have had a long discussion regarding obstructive sleep apnea.  It is possible that she is not feeling refreshed because she is adequately treating her sleep apnea.  I would like an in lab diagnostic split-night sleep study.  She is interested in pursuing inspire.  I have asked that we optimize her CPAP first.  We discussed weight loss.  Follow-up after sleep study    Theo Spivey MD

## 2024-01-29 ENCOUNTER — APPOINTMENT (OUTPATIENT)
Dept: OTOLARYNGOLOGY | Facility: CLINIC | Age: 64
End: 2024-01-29
Payer: COMMERCIAL

## 2024-01-31 ENCOUNTER — APPOINTMENT (OUTPATIENT)
Dept: OTOLARYNGOLOGY | Facility: CLINIC | Age: 64
End: 2024-01-31
Payer: COMMERCIAL

## 2024-02-13 ENCOUNTER — APPOINTMENT (OUTPATIENT)
Dept: UROLOGY | Facility: CLINIC | Age: 64
End: 2024-02-13
Payer: COMMERCIAL

## 2024-03-04 ENCOUNTER — PRE-ADMISSION TESTING (OUTPATIENT)
Dept: PREADMISSION TESTING | Facility: HOSPITAL | Age: 64
End: 2024-03-04
Payer: COMMERCIAL

## 2024-03-04 VITALS
HEART RATE: 59 BPM | DIASTOLIC BLOOD PRESSURE: 74 MMHG | HEIGHT: 68 IN | RESPIRATION RATE: 18 BRPM | TEMPERATURE: 96.4 F | BODY MASS INDEX: 34.52 KG/M2 | SYSTOLIC BLOOD PRESSURE: 114 MMHG | OXYGEN SATURATION: 96 % | WEIGHT: 227.74 LBS

## 2024-03-04 DIAGNOSIS — G47.33 OBSTRUCTIVE SLEEP APNEA SYNDROME: ICD-10-CM

## 2024-03-04 DIAGNOSIS — I47.10 SUPRAVENTRICULAR TACHYCARDIA (CMS-HCC): ICD-10-CM

## 2024-03-04 DIAGNOSIS — Z00.00 ROUTINE GENERAL MEDICAL EXAMINATION AT A HEALTH CARE FACILITY: ICD-10-CM

## 2024-03-04 DIAGNOSIS — E78.49 OTHER HYPERLIPIDEMIA: ICD-10-CM

## 2024-03-04 DIAGNOSIS — Z01.818 PRE-OPERATIVE EXAMINATION: Primary | ICD-10-CM

## 2024-03-04 LAB
ANION GAP SERPL CALC-SCNC: 13 MMOL/L (ref 10–20)
BASOPHILS # BLD AUTO: 0.02 X10*3/UL (ref 0–0.1)
BASOPHILS NFR BLD AUTO: 0.4 %
BUN SERPL-MCNC: 29 MG/DL (ref 6–23)
CALCIUM SERPL-MCNC: 9.4 MG/DL (ref 8.6–10.3)
CHLORIDE SERPL-SCNC: 103 MMOL/L (ref 98–107)
CO2 SERPL-SCNC: 29 MMOL/L (ref 21–32)
CREAT SERPL-MCNC: 0.95 MG/DL (ref 0.5–1.05)
EGFRCR SERPLBLD CKD-EPI 2021: 68 ML/MIN/1.73M*2
EOSINOPHIL # BLD AUTO: 0.33 X10*3/UL (ref 0–0.7)
EOSINOPHIL NFR BLD AUTO: 6.5 %
ERYTHROCYTE [DISTWIDTH] IN BLOOD BY AUTOMATED COUNT: 13.7 % (ref 11.5–14.5)
GLUCOSE SERPL-MCNC: 110 MG/DL (ref 74–99)
HCT VFR BLD AUTO: 41.4 % (ref 36–46)
HGB BLD-MCNC: 13.3 G/DL (ref 12–16)
IMM GRANULOCYTES # BLD AUTO: 0.01 X10*3/UL (ref 0–0.7)
IMM GRANULOCYTES NFR BLD AUTO: 0.2 % (ref 0–0.9)
LYMPHOCYTES # BLD AUTO: 1.43 X10*3/UL (ref 1.2–4.8)
LYMPHOCYTES NFR BLD AUTO: 28 %
MCH RBC QN AUTO: 30.2 PG (ref 26–34)
MCHC RBC AUTO-ENTMCNC: 32.1 G/DL (ref 32–36)
MCV RBC AUTO: 94 FL (ref 80–100)
MONOCYTES # BLD AUTO: 0.35 X10*3/UL (ref 0.1–1)
MONOCYTES NFR BLD AUTO: 6.8 %
NEUTROPHILS # BLD AUTO: 2.97 X10*3/UL (ref 1.2–7.7)
NEUTROPHILS NFR BLD AUTO: 58.1 %
NRBC BLD-RTO: 0 /100 WBCS (ref 0–0)
PLATELET # BLD AUTO: 156 X10*3/UL (ref 150–450)
POTASSIUM SERPL-SCNC: 4.1 MMOL/L (ref 3.5–5.3)
RBC # BLD AUTO: 4.4 X10*6/UL (ref 4–5.2)
SODIUM SERPL-SCNC: 141 MMOL/L (ref 136–145)
WBC # BLD AUTO: 5.1 X10*3/UL (ref 4.4–11.3)

## 2024-03-04 PROCEDURE — 85025 COMPLETE CBC W/AUTO DIFF WBC: CPT

## 2024-03-04 PROCEDURE — 80048 BASIC METABOLIC PNL TOTAL CA: CPT

## 2024-03-04 PROCEDURE — 83036 HEMOGLOBIN GLYCOSYLATED A1C: CPT | Mod: GEALAB

## 2024-03-04 PROCEDURE — 36415 COLL VENOUS BLD VENIPUNCTURE: CPT

## 2024-03-04 PROCEDURE — 99204 OFFICE O/P NEW MOD 45 MIN: CPT | Performed by: REGISTERED NURSE

## 2024-03-04 PROCEDURE — 84443 ASSAY THYROID STIM HORMONE: CPT

## 2024-03-04 RX ORDER — BISMUTH SUBSALICYLATE 262 MG
1 TABLET,CHEWABLE ORAL DAILY
COMMUNITY

## 2024-03-04 RX ORDER — CHOLECALCIFEROL (VITAMIN D3) 125 MCG
125 CAPSULE ORAL DAILY
COMMUNITY
End: 2024-03-04 | Stop reason: ALTCHOICE

## 2024-03-04 ASSESSMENT — ENCOUNTER SYMPTOMS
CARDIOVASCULAR NEGATIVE: 1
CONSTITUTIONAL NEGATIVE: 1
NEUROLOGICAL NEGATIVE: 1
RESPIRATORY NEGATIVE: 1
ARTHRALGIAS: 1
GASTROINTESTINAL NEGATIVE: 1
NECK STIFFNESS: 1

## 2024-03-04 ASSESSMENT — LIFESTYLE VARIABLES: SMOKING_STATUS: NONSMOKER

## 2024-03-04 ASSESSMENT — CHADS2 SCORE
PRIOR STROKE OR TIA OR THROMBOEMBOLISM: NO
CHADS2 SCORE: 2
AGE GREATER THAN OR EQUAL TO 75: NO
AGE GREATER THAN OR EQUAL TO 75: NO
CHF: NO
HYPERTENSION: YES
DIABETES: YES

## 2024-03-04 ASSESSMENT — PAIN - FUNCTIONAL ASSESSMENT: PAIN_FUNCTIONAL_ASSESSMENT: 0-10

## 2024-03-04 ASSESSMENT — PAIN SCALES - GENERAL: PAINLEVEL_OUTOF10: 3

## 2024-03-04 NOTE — PREPROCEDURE INSTRUCTIONS
Medication List            Accurate as of March 4, 2024  7:58 AM. Always use your most recent med list.                aspirin 81 mg chewable tablet  CHEW 1 TABLET BY MOUTH ONCE DAILY  Medication Adjustments for Surgery: Other (Comment)  Notes to patient: I will call you with instructions per Dr. Hira varela-vitamin C-folic acid 1- mg-mg-mcg tablet  Commonly known as: Nephro-[facility] Rx  Medication Adjustments for Surgery: Stop 7 days before surgery     cholecalciferol (vitamin D3) 100 mcg (4,000 unit) tablet  Medication Adjustments for Surgery: Stop 7 days before surgery     clindamycin 300 mg capsule  Commonly known as: Cleocin  take 2 capsules by oral route 1 hour prior to procedure and 1 capsule by oral route 6 hours post procedure. save remainder for future appointments  Medication Adjustments for Surgery: Other (Comment)     EPINEPHrine 0.3 mg/0.3 mL injection syringe  Commonly known as: Epipen  INJECT 1 SYRINGE INTRAMUSCULARLY AS NEEDED FOR SHORTNESS OF BREATH  Medication Adjustments for Surgery: Other (Comment)     losartan 100 mg tablet  Commonly known as: Cozaar  1 tablet Orally Once a day  Medication Adjustments for Surgery: Other (Comment)  Notes to patient: Do not take day of surgery     metoprolol succinate XL 25 mg 24 hr tablet  Commonly known as: Toprol-XL  1 tablet Orally Once a day 90 days  Medication Adjustments for Surgery: Take morning of surgery with sip of water, no other fluids     multivitamin tablet  Medication Adjustments for Surgery: Stop 7 days before surgery     prochlorperazine 10 mg tablet  Commonly known as: Compazine  Take 1 tablet (10 mg) by mouth every 6 hours if needed for nausea or vomiting.     zolpidem 10 mg tablet  Commonly known as: Ambien  Take 1 tablet (10 mg) by mouth as needed at bedtime for sleep.  Medication Adjustments for Surgery: Continue until night before surgery              SURGERY PRE-OPERATIVE INSTRUCTIONS    *You will receive a phone call  the day before your procedure  after 2pm, (or the Friday before your surgery if scheduled on a Monday.) Generally the hospital will be calling you with this information after that time.    *You are not to eat after midnight the night before the surgery. You may have 8oz of a clear liquid up until 2 hours prior to arriving to the hospital. The exception is with medications you were instructed to take day of surgery.    *You may take tylenol for pain/discomfort as needed.     *Stop taking all aspirin products, ibuprofen (motrin/advil), naproxen (aleve/naprosyn) for one week prior to surgery.    *Stop taking all vitamins and supplements one week prior to surgery.     *You should not have alcoholic beverages for 24 hours before surgery.     *You should not smoke 24 hours prior to surgery.     *To help prevent surgical infections bathe/shower with Dial soap the evening before surgery.    *You can wear deodorant but no lotion, powder, or perfume/cologne. You should remove all make-up and nail polish at home.    *If you wear glasses, please bring a case for the glasses with you.    *You will be asked to remove dentures and contacts.     *Please leave all valuables at home.    *You should wear loose, comfortable clothing that will accommodate bandages and/or casts.    *You should notify your doctor of any change in your condition (fever, cold, rash, etc). Surgery may need to be re-scheduled until a time you are in better health.    *A responsible adult is required to accompany you to and from the hospital if you are receiving anesthesia or a sedative. Patients are not permitted to drive for 24 hours after anesthesia.     *You can use the [employer] parking if you wish.     *If you have any further questions please call [facility] [phone].                  NPO Instructions:        Additional Instructions:

## 2024-03-04 NOTE — H&P (VIEW-ONLY)
CPM/PAT Evaluation       Name: Melany Yuanolls (Melany Yuanolls)  /Age: 1960/63 y.o.     In-Person       Chief Complaint: Evaluation prior to surgery    HPI  63 year old female scheduled for Right Shoulder decompression subacromial arthroscopy, RCR repair on 3/11/24 with Dr. Guillen secondary to complete tear of right rotator cuff. PMHx includes RALPH, anemia, HTN, Afib, DM, memory deficit, Ankylosing spondylitis, bilateral hip replacement, right knee replacement and left shoulder repair. Presents to CPM today for risk stratification and optimization.   Past Medical History:   Diagnosis Date    Acute urinary tract infection 2023    Adverse reaction to drug 2023    Allergic reaction 2023    Ankylosing spondylitis (CMS/HCC)     Fever 2023    RALPH on CPAP     Rotator cuff tear 2023    right    Vitamin D deficiency        Past Surgical History:   Procedure Laterality Date    COLONOSCOPY  2018    HYSTERECTOMY      JOINT REPLACEMENT Bilateral     hips    KNEE ARTHROPLASTY Right     ROTATOR CUFF REPAIR Left        Patient  has no history on file for sexual activity.    Family History   Problem Relation Name Age of Onset    Breast cancer Sister         Allergies   Allergen Reactions    Amoxicillin Itching and Shortness of breath    Articaine-Epinephrine Bitart Itching and Shortness of breath    Benzocaine Itching and Shortness of breath    Epinephrine Other       Prior to Admission medications    Medication Sig Start Date End Date Taking? Authorizing Provider   aspirin 81 mg chewable tablet CHEW 1 TABLET BY MOUTH ONCE DAILY 23  Oscar Iniguez MD   clindamycin (Cleocin) 300 mg capsule take 2 capsules by oral route 1 hour prior to procedure and 1 capsule by oral route 6 hours post procedure. save remainder for future appointments 24      EPINEPHrine (Epipen) 0.3 mg/0.3 mL injection syringe INJECT 1 SYRINGE INTRAMUSCULARLY AS NEEDED FOR SHORTNESS OF BREATH 23  8/17/24  Sammy Sanders PA-C   losartan (Cozaar) 100 mg tablet 1 tablet Orally Once a day 1/22/24      metoprolol succinate XL (Toprol-XL) 25 mg 24 hr tablet 1 tablet Orally Once a day 90 days 1/22/24      omega-3 fatty acids-fish oil (Fish OiL) 360-1,200 mg capsule take 1 capsule Orally twice a day (bid) 90 days 1/22/24      prochlorperazine (Compazine) 10 mg tablet Take 1 tablet (10 mg) by mouth every 6 hours if needed for nausea or vomiting. 11/3/23   Freddy Bowling DO   zolpidem (Ambien) 10 mg tablet Take 1 tablet (10 mg) by mouth as needed at bedtime for sleep. 11/3/23   Freddy Bowling DO        PAT ROS:   Constitutional:   neg    Neuro/Psych:   neg    Eyes:    use of corrective lenses  Ears:   neg    Nose:   Mouth:   neg    Throat:   neg    Neck:    Neck stiffness   neck stiffness  Cardio:   neg    Respiratory:   neg    Endocrine:   GI:   neg    :   neg    Musculoskeletal:    Right shoulder   arthralgias  Hematologic:    history of blood transfusion  Skin:  neg        Physical Exam  Vitals reviewed.   Constitutional:       Appearance: Normal appearance.   HENT:      Head: Normocephalic and atraumatic.      Nose: Nose normal.      Mouth/Throat:      Mouth: Mucous membranes are moist.      Pharynx: Oropharynx is clear.   Eyes:      Extraocular Movements: Extraocular movements intact.      Pupils: Pupils are equal, round, and reactive to light.   Neck:      Comments: Neck stiffness  Cardiovascular:      Rate and Rhythm: Normal rate and regular rhythm.      Pulses: Normal pulses.      Heart sounds: Normal heart sounds.   Pulmonary:      Effort: Pulmonary effort is normal.      Breath sounds: Normal breath sounds.   Abdominal:      Palpations: Abdomen is soft.   Musculoskeletal:         General: Tenderness present.      Comments: Right shoulder   Skin:     General: Skin is warm and dry.      Capillary Refill: Capillary refill takes less than 2 seconds.   Neurological:      General: No focal deficit present.       Mental Status: She is alert and oriented to person, place, and time.   Psychiatric:         Mood and Affect: Mood normal.         Behavior: Behavior normal.         Thought Content: Thought content normal.         Judgment: Judgment normal.          PAT AIRWAY:   Airway:     Mallampati::  III    TM distance::  >3 FB    Neck ROM::  Limited  normal        There were no vitals taken for this visit.    DASI Risk Score    No data to display       Caprini DVT Assessment    No data to display       Modified Frailty Index    No data to display       CHADS2 Stroke Risk  Current as of 2 hours ago        4% 3 - 100%: High Risk   2 - 3%: Medium Risk   0 - 2%: Low Risk     No Change          This score determines the patient's risk of having a stroke if the patient has atrial fibrillation.          Points Metrics   0 Has Congestive Heart Failure:  No     Patients with congestive heart failure get 1 point.    Current as of 2 hours ago   1 Has Hypertension:  Yes     Patients with hypertension get 1 point.    Current as of 2 hours ago   0 Age:  63     Patients who are 75 years of age or older get 1 point.    Current as of 2 hours ago   1 Has Diabetes:  Yes     Patients with diabetes get 1 point.    Current as of 2 hours ago   0 Had Stroke:  No  Had TIA:  No  Had Thromboembolism:  No     Patients who have had a stroke, TIA, or thromboembolism get 2 points.    Current as of 2 hours ago             Revised Cardiac Risk Index    No data to display       Apfel Simplified Score    No data to display       Risk Analysis Index Results This Encounter    No data found in the last 1 encounters.         Anesthesia:  The patient notes anesthesia complications in the past related to delayed emergence and post-operative nausea and vomiting.     Neuro:   History of memory deficit. The patient is at increased risk for perioperative stroke secondary to a-fib, hypertension , increased age, female gender, diabetes mellitus.    HEENT/Airway  The  patient has diagnoses, significant findings on chart review, clinical presentation or evaluation of neck stiffness due to Ankylosing spondylitis.    Cardiovascular    RCRI  The patient meets 0-1 RCRI criteria and therefore has a less than 1% risk of major adverse cardiac complications.  METS  The patient's functional capacity capacity is greater than 4 METS.  EKG  1/22/24  Sinus bradycardia   Rate 58  Qtc is 431ms.  No change from last visit  Hypertension Evaluation  The patient has a known history of hypertension that is controlled on losartan and metoprolol.  Heart Rhythm Evaluation  Patient has a history of Atrial fibrillation controlled on ASA.  Heart Valve Evaluation  4/25/23 ECHO   CONCLUSIONS:  1. Left ventricular systolic function is normal with a 60-65% estimated ejection fraction.  2. Spectral Doppler shows an impaired relaxation pattern of left ventricular diastolic filling.  3. RVSP within normal limits.     QUANTITATIVE DATA SUMMARY:  2D MEASUREMENTS:  Normal Ranges:  IVSd:          1.36 cm   (0.6-1.1cm)  LVPWd:         1.01 cm   (0.6-1.1cm)  LVIDd:         4.39 cm   (3.9-5.9cm)  LVIDs:         2.70 cm  LV Mass Index: 89.9 g/m2  LV % FS        38.5 %     LA VOLUME:  Normal Ranges:  LA Vol A4C:       55.9 ml    (22+/-6mL/m2)  LA Vol A2C:       61.0 ml  LA Vol BP:        58.7 ml  LA Vol Index A4C: 26.7 ml/m2  LA Vol Index A2C: 29.2 ml/m2  LA Vol Index BP:  28.1 ml/m2  LA Volume Index:  28.1 ml/m2  LA Vol A4C:       47.4 ml  LA Vol A2C:       57.8 ml     RA VOLUME BY A/L METHOD:  Normal Ranges:  RA Area A4C: 15.3 cm2     M-MODE MEASUREMENTS:  Normal Ranges:  Ao Root: 2.90 cm (2.0-3.7cm)  LAs:     3.61 cm (2.7-4.0cm)     LV SYSTOLIC FUNCTION BY 2D PLANIMETRY (MOD):  Normal Ranges:  EF-A4C View: 63.5 % (>=55%)  EF-A2C View: 65.1 %  EF-Biplane:  64.5 %     LV DIASTOLIC FUNCTION:  Normal Ranges:  MV Peak E:        0.77 m/s    (0.7-1.2 m/s)  MV Peak A:        0.82 m/s    (0.42-0.7 m/s)  E/A Ratio:        0.94         (1.0-2.2)  MV e'             0.09 m/s    (>8.0)  MV lateral e'     0.09 m/s  MV medial e'      0.07 m/s  MV A Dur:         113.03 msec  E/e' Ratio:       8.60        (<8.0)  PulmV Sys Noah:    47.89 cm/s  PulmV Wolf Noah:   35.23 cm/s  PulmV S/D Noah:    1.36  PulmV A Revs Noah: 23.14 cm/s  PulmV A Revs Dur: 106.11 msec     MITRAL VALVE:  Normal Ranges:  MV DT: 266 msec (150-240msec)     AORTIC VALVE:  Normal Ranges:  AoV Vmax:                1.76 m/s  (<=1.7m/s)  AoV Peak P.4 mmHg (<20mmHg)  AoV Mean P.6 mmHg  (1.7-11.5mmHg)  LVOT Max Noah:            0.94 m/s  (<=1.1m/s)  AoV VTI:                 40.55 cm  (18-25cm)  LVOT VTI:                20.50 cm  LVOT Diameter:           2.04 cm   (1.8-2.4cm)  AoV Area, VTI:           1.66 cm2  (2.5-5.5cm2)  AoV Area,Vmax:           1.75 cm2  (2.5-4.5cm2)  AoV Dimensionless Index: 0.51     RIGHT VENTRICLE:  RV 1   3.5 cm  RV 2   2.1 cm  RV 3   6.5 cm  TAPSE: 29.0 mm  RV s'  0.16 m/s     TRICUSPID VALVE/RVSP:  Normal Ranges:  Peak TR Velocity: 1.92 m/s  RV Syst Pressure: 17.8 mmHg (< 30mmHg)     PULMONIC VALVE:  Normal Ranges:  PV Max Noah: 0.9 m/s  (0.6-0.9m/s)  PV Max PG:  3.5 mmHg     Pulmonary Veins:  PulmV A Revs Dur: 106.11 msec  PulmV A Revs Noah: 23.14 cm/s  PulmV Wolf Noah:   35.23 cm/s  PulmV S/D Noah:    1.36  PulmV Sys Noah:    47.89 cm/s  CARDS EVAL  The patient follows with cardiology, Dr. Iniguez. Patient was last seen 24. Per note, low risk for procedure .    SHOSHANA score which indicates a 0.1% risk of intraoperative or 30-day postoperative.    Pulmonary   The patient has findings on chart review, clinical presentation and evaluation significant for RALPH compliant with CPAP.  The patient has a stop bang score of 4, which places patient at intermediate risk for having RALPH.    ARISCAT 3, low, 1.6% risk of in-hospital postoperative pulmonary complications  PRODIGY 13, intermediate risk of respiratory depression episode. Patient  given PI sheet for preoperative deep breathing exercises.    Hematology  History of Anemia, blood transfusion 5-10 yrs ago with no complications per patient.   Antiplatelet management   The patient is currently receiving antiplatelet therapy for afib.  Anticoagulation management  The patient is not currently receiving anticoagulation therapy.  Caprini score 8, high risk of perioperative VTE. Patient instructed to ambulate as soon as possible postoperatively to decrease thromboembolic risk. Initiate mechanical DVT prophylaxis as soon as possible and initiate chemical prophylaxis when deemed safe from a bleeding standpoint post surgery.     Gastrointestinal  No diagnoses or significant findings on chart review or clinical presentation and evaluation.  Eat 10- 0,  self-perceived oropharyngeal dysphagia scale (0-40)     Genitourinary  No diagnoses or significant findings on chart review or clinical presentation and evaluation.    Renal  The patient has no known history of chronic kidney disease..     Musculoskeletal  The patient has diagnoses or significant findings on chart review or clinical presentation and evaluation significant for Traumatic complete tear of right rotator cuff.     Endocrine  Diabetes Evaluation  The patient has history of diabetes mellitus controlled by diet. 4/25/23 A1C 6.0  Thyroid Disease Evaluation  The patient has no history of thyroid disease.    ID  No diagnoses or significant findings on chart review or clinical presentation and evaluation.    -Preoperative medication instructions were provided and reviewed with the patient.  Any additional testing or evaluation was explained to the patient.  NPO Instructions were discussed, and the patient's questions were answered prior to conclusion of this encounter.

## 2024-03-04 NOTE — CPM/PAT H&P
CPM/PAT Evaluation       Name: Melany Yuanolls (Melany Yuanolls)  /Age: 1960/63 y.o.     In-Person       Chief Complaint: Evaluation prior to surgery    HPI  63 year old female scheduled for Right Shoulder decompression subacromial arthroscopy, RCR repair on 3/11/24 with Dr. Guillen secondary to complete tear of right rotator cuff. PMHx includes RALPH, anemia, HTN, Afib, DM, memory deficit, Ankylosing spondylitis, bilateral hip replacement, right knee replacement and left shoulder repair. Presents to CPM today for risk stratification and optimization.   Past Medical History:   Diagnosis Date    Acute urinary tract infection 2023    Adverse reaction to drug 2023    Allergic reaction 2023    Ankylosing spondylitis (CMS/HCC)     Fever 2023    RALPH on CPAP     Rotator cuff tear 2023    right    Vitamin D deficiency        Past Surgical History:   Procedure Laterality Date    COLONOSCOPY  2018    HYSTERECTOMY      JOINT REPLACEMENT Bilateral     hips    KNEE ARTHROPLASTY Right     ROTATOR CUFF REPAIR Left        Patient  has no history on file for sexual activity.    Family History   Problem Relation Name Age of Onset    Breast cancer Sister         Allergies   Allergen Reactions    Amoxicillin Itching and Shortness of breath    Articaine-Epinephrine Bitart Itching and Shortness of breath    Benzocaine Itching and Shortness of breath    Epinephrine Other       Prior to Admission medications    Medication Sig Start Date End Date Taking? Authorizing Provider   aspirin 81 mg chewable tablet CHEW 1 TABLET BY MOUTH ONCE DAILY 23  Oscar Iniguez MD   clindamycin (Cleocin) 300 mg capsule take 2 capsules by oral route 1 hour prior to procedure and 1 capsule by oral route 6 hours post procedure. save remainder for future appointments 24      EPINEPHrine (Epipen) 0.3 mg/0.3 mL injection syringe INJECT 1 SYRINGE INTRAMUSCULARLY AS NEEDED FOR SHORTNESS OF BREATH 23  8/17/24  Sammy Sanders PA-C   losartan (Cozaar) 100 mg tablet 1 tablet Orally Once a day 1/22/24      metoprolol succinate XL (Toprol-XL) 25 mg 24 hr tablet 1 tablet Orally Once a day 90 days 1/22/24      omega-3 fatty acids-fish oil (Fish OiL) 360-1,200 mg capsule take 1 capsule Orally twice a day (bid) 90 days 1/22/24      prochlorperazine (Compazine) 10 mg tablet Take 1 tablet (10 mg) by mouth every 6 hours if needed for nausea or vomiting. 11/3/23   Freddy Bowling DO   zolpidem (Ambien) 10 mg tablet Take 1 tablet (10 mg) by mouth as needed at bedtime for sleep. 11/3/23   Freddy Bowling DO        PAT ROS:   Constitutional:   neg    Neuro/Psych:   neg    Eyes:    use of corrective lenses  Ears:   neg    Nose:   Mouth:   neg    Throat:   neg    Neck:    Neck stiffness   neck stiffness  Cardio:   neg    Respiratory:   neg    Endocrine:   GI:   neg    :   neg    Musculoskeletal:    Right shoulder   arthralgias  Hematologic:    history of blood transfusion  Skin:  neg        Physical Exam  Vitals reviewed.   Constitutional:       Appearance: Normal appearance.   HENT:      Head: Normocephalic and atraumatic.      Nose: Nose normal.      Mouth/Throat:      Mouth: Mucous membranes are moist.      Pharynx: Oropharynx is clear.   Eyes:      Extraocular Movements: Extraocular movements intact.      Pupils: Pupils are equal, round, and reactive to light.   Neck:      Comments: Neck stiffness  Cardiovascular:      Rate and Rhythm: Normal rate and regular rhythm.      Pulses: Normal pulses.      Heart sounds: Normal heart sounds.   Pulmonary:      Effort: Pulmonary effort is normal.      Breath sounds: Normal breath sounds.   Abdominal:      Palpations: Abdomen is soft.   Musculoskeletal:         General: Tenderness present.      Comments: Right shoulder   Skin:     General: Skin is warm and dry.      Capillary Refill: Capillary refill takes less than 2 seconds.   Neurological:      General: No focal deficit present.       Mental Status: She is alert and oriented to person, place, and time.   Psychiatric:         Mood and Affect: Mood normal.         Behavior: Behavior normal.         Thought Content: Thought content normal.         Judgment: Judgment normal.          PAT AIRWAY:   Airway:     Mallampati::  III    TM distance::  >3 FB    Neck ROM::  Limited  normal        There were no vitals taken for this visit.    DASI Risk Score    No data to display       Caprini DVT Assessment    No data to display       Modified Frailty Index    No data to display       CHADS2 Stroke Risk  Current as of 2 hours ago        4% 3 - 100%: High Risk   2 - 3%: Medium Risk   0 - 2%: Low Risk     No Change          This score determines the patient's risk of having a stroke if the patient has atrial fibrillation.          Points Metrics   0 Has Congestive Heart Failure:  No     Patients with congestive heart failure get 1 point.    Current as of 2 hours ago   1 Has Hypertension:  Yes     Patients with hypertension get 1 point.    Current as of 2 hours ago   0 Age:  63     Patients who are 75 years of age or older get 1 point.    Current as of 2 hours ago   1 Has Diabetes:  Yes     Patients with diabetes get 1 point.    Current as of 2 hours ago   0 Had Stroke:  No  Had TIA:  No  Had Thromboembolism:  No     Patients who have had a stroke, TIA, or thromboembolism get 2 points.    Current as of 2 hours ago             Revised Cardiac Risk Index    No data to display       Apfel Simplified Score    No data to display       Risk Analysis Index Results This Encounter    No data found in the last 1 encounters.         Anesthesia:  The patient notes anesthesia complications in the past related to delayed emergence and post-operative nausea and vomiting.     Neuro:   History of memory deficit. The patient is at increased risk for perioperative stroke secondary to a-fib, hypertension , increased age, female gender, diabetes mellitus.    HEENT/Airway  The  patient has diagnoses, significant findings on chart review, clinical presentation or evaluation of neck stiffness due to Ankylosing spondylitis.    Cardiovascular    RCRI  The patient meets 0-1 RCRI criteria and therefore has a less than 1% risk of major adverse cardiac complications.  METS  The patient's functional capacity capacity is greater than 4 METS.  EKG  1/22/24  Sinus bradycardia   Rate 58  Qtc is 431ms.  No change from last visit  Hypertension Evaluation  The patient has a known history of hypertension that is controlled on losartan and metoprolol.  Heart Rhythm Evaluation  Patient has a history of Atrial fibrillation controlled on ASA.  Heart Valve Evaluation  4/25/23 ECHO   CONCLUSIONS:  1. Left ventricular systolic function is normal with a 60-65% estimated ejection fraction.  2. Spectral Doppler shows an impaired relaxation pattern of left ventricular diastolic filling.  3. RVSP within normal limits.     QUANTITATIVE DATA SUMMARY:  2D MEASUREMENTS:  Normal Ranges:  IVSd:          1.36 cm   (0.6-1.1cm)  LVPWd:         1.01 cm   (0.6-1.1cm)  LVIDd:         4.39 cm   (3.9-5.9cm)  LVIDs:         2.70 cm  LV Mass Index: 89.9 g/m2  LV % FS        38.5 %     LA VOLUME:  Normal Ranges:  LA Vol A4C:       55.9 ml    (22+/-6mL/m2)  LA Vol A2C:       61.0 ml  LA Vol BP:        58.7 ml  LA Vol Index A4C: 26.7 ml/m2  LA Vol Index A2C: 29.2 ml/m2  LA Vol Index BP:  28.1 ml/m2  LA Volume Index:  28.1 ml/m2  LA Vol A4C:       47.4 ml  LA Vol A2C:       57.8 ml     RA VOLUME BY A/L METHOD:  Normal Ranges:  RA Area A4C: 15.3 cm2     M-MODE MEASUREMENTS:  Normal Ranges:  Ao Root: 2.90 cm (2.0-3.7cm)  LAs:     3.61 cm (2.7-4.0cm)     LV SYSTOLIC FUNCTION BY 2D PLANIMETRY (MOD):  Normal Ranges:  EF-A4C View: 63.5 % (>=55%)  EF-A2C View: 65.1 %  EF-Biplane:  64.5 %     LV DIASTOLIC FUNCTION:  Normal Ranges:  MV Peak E:        0.77 m/s    (0.7-1.2 m/s)  MV Peak A:        0.82 m/s    (0.42-0.7 m/s)  E/A Ratio:        0.94         (1.0-2.2)  MV e'             0.09 m/s    (>8.0)  MV lateral e'     0.09 m/s  MV medial e'      0.07 m/s  MV A Dur:         113.03 msec  E/e' Ratio:       8.60        (<8.0)  PulmV Sys Noah:    47.89 cm/s  PulmV Owlf Noah:   35.23 cm/s  PulmV S/D Noah:    1.36  PulmV A Revs Noah: 23.14 cm/s  PulmV A Revs Dur: 106.11 msec     MITRAL VALVE:  Normal Ranges:  MV DT: 266 msec (150-240msec)     AORTIC VALVE:  Normal Ranges:  AoV Vmax:                1.76 m/s  (<=1.7m/s)  AoV Peak P.4 mmHg (<20mmHg)  AoV Mean P.6 mmHg  (1.7-11.5mmHg)  LVOT Max Noah:            0.94 m/s  (<=1.1m/s)  AoV VTI:                 40.55 cm  (18-25cm)  LVOT VTI:                20.50 cm  LVOT Diameter:           2.04 cm   (1.8-2.4cm)  AoV Area, VTI:           1.66 cm2  (2.5-5.5cm2)  AoV Area,Vmax:           1.75 cm2  (2.5-4.5cm2)  AoV Dimensionless Index: 0.51     RIGHT VENTRICLE:  RV 1   3.5 cm  RV 2   2.1 cm  RV 3   6.5 cm  TAPSE: 29.0 mm  RV s'  0.16 m/s     TRICUSPID VALVE/RVSP:  Normal Ranges:  Peak TR Velocity: 1.92 m/s  RV Syst Pressure: 17.8 mmHg (< 30mmHg)     PULMONIC VALVE:  Normal Ranges:  PV Max Noah: 0.9 m/s  (0.6-0.9m/s)  PV Max PG:  3.5 mmHg     Pulmonary Veins:  PulmV A Revs Dur: 106.11 msec  PulmV A Revs Noah: 23.14 cm/s  PulmV Wolf Noah:   35.23 cm/s  PulmV S/D Noah:    1.36  PulmV Sys Noah:    47.89 cm/s  CARDS EVAL  The patient follows with cardiology, Dr. Iniguez. Patient was last seen 24. Per note, low risk for procedure .    SHOSHANA score which indicates a 0.1% risk of intraoperative or 30-day postoperative.    Pulmonary   The patient has findings on chart review, clinical presentation and evaluation significant for RALPH compliant with CPAP.  The patient has a stop bang score of 4, which places patient at intermediate risk for having RALPH.    ARISCAT 3, low, 1.6% risk of in-hospital postoperative pulmonary complications  PRODIGY 13, intermediate risk of respiratory depression episode. Patient  given PI sheet for preoperative deep breathing exercises.    Hematology  History of Anemia, blood transfusion 5-10 yrs ago with no complications per patient.   Antiplatelet management   The patient is currently receiving antiplatelet therapy for afib.  Anticoagulation management  The patient is not currently receiving anticoagulation therapy.  Caprini score 8, high risk of perioperative VTE. Patient instructed to ambulate as soon as possible postoperatively to decrease thromboembolic risk. Initiate mechanical DVT prophylaxis as soon as possible and initiate chemical prophylaxis when deemed safe from a bleeding standpoint post surgery.     Gastrointestinal  No diagnoses or significant findings on chart review or clinical presentation and evaluation.  Eat 10- 0,  self-perceived oropharyngeal dysphagia scale (0-40)     Genitourinary  No diagnoses or significant findings on chart review or clinical presentation and evaluation.    Renal  The patient has no known history of chronic kidney disease..     Musculoskeletal  The patient has diagnoses or significant findings on chart review or clinical presentation and evaluation significant for Traumatic complete tear of right rotator cuff.     Endocrine  Diabetes Evaluation  The patient has history of diabetes mellitus controlled by diet. 4/25/23 A1C 6.0  Thyroid Disease Evaluation  The patient has no history of thyroid disease.    ID  No diagnoses or significant findings on chart review or clinical presentation and evaluation.    -Preoperative medication instructions were provided and reviewed with the patient.  Any additional testing or evaluation was explained to the patient.  NPO Instructions were discussed, and the patient's questions were answered prior to conclusion of this encounter.

## 2024-03-05 ENCOUNTER — OFFICE VISIT (OUTPATIENT)
Dept: PRIMARY CARE | Facility: CLINIC | Age: 64
End: 2024-03-05
Payer: COMMERCIAL

## 2024-03-05 VITALS
OXYGEN SATURATION: 96 % | HEIGHT: 68 IN | SYSTOLIC BLOOD PRESSURE: 118 MMHG | WEIGHT: 228.4 LBS | HEART RATE: 69 BPM | DIASTOLIC BLOOD PRESSURE: 74 MMHG | BODY MASS INDEX: 34.62 KG/M2

## 2024-03-05 DIAGNOSIS — G47.33 OBSTRUCTIVE SLEEP APNEA SYNDROME: ICD-10-CM

## 2024-03-05 DIAGNOSIS — Z00.00 ROUTINE GENERAL MEDICAL EXAMINATION AT A HEALTH CARE FACILITY: Primary | ICD-10-CM

## 2024-03-05 DIAGNOSIS — I10 PRIMARY HYPERTENSION: ICD-10-CM

## 2024-03-05 DIAGNOSIS — R91.1 LUNG NODULE: ICD-10-CM

## 2024-03-05 DIAGNOSIS — D64.9 ANEMIA, UNSPECIFIED TYPE: ICD-10-CM

## 2024-03-05 DIAGNOSIS — I48.91 RAPID ATRIAL FIBRILLATION (MULTI): ICD-10-CM

## 2024-03-05 DIAGNOSIS — I47.10 SUPRAVENTRICULAR TACHYCARDIA (CMS-HCC): ICD-10-CM

## 2024-03-05 DIAGNOSIS — E55.9 VITAMIN D DEFICIENCY: ICD-10-CM

## 2024-03-05 DIAGNOSIS — E78.49 OTHER HYPERLIPIDEMIA: ICD-10-CM

## 2024-03-05 DIAGNOSIS — K21.9 GASTROESOPHAGEAL REFLUX DISEASE WITHOUT ESOPHAGITIS: ICD-10-CM

## 2024-03-05 PROBLEM — Z79.84 LONG TERM (CURRENT) USE OF ORAL HYPOGLYCEMIC DRUGS: Status: ACTIVE | Noted: 2022-08-09

## 2024-03-05 PROBLEM — M24.811: Status: ACTIVE | Noted: 2023-06-12

## 2024-03-05 PROBLEM — M19.011 PRIMARY OSTEOARTHRITIS, RIGHT SHOULDER: Status: ACTIVE | Noted: 2022-08-08

## 2024-03-05 PROBLEM — R26.89 OTHER ABNORMALITIES OF GAIT AND MOBILITY: Status: ACTIVE | Noted: 2024-03-05

## 2024-03-05 PROBLEM — M1A.0710 CHRONIC GOUT OF RIGHT ANKLE: Status: ACTIVE | Noted: 2023-11-28

## 2024-03-05 PROBLEM — I48.20 CHRONIC ATRIAL FIBRILLATION, UNSPECIFIED (MULTI): Status: ACTIVE | Noted: 2023-04-25

## 2024-03-05 PROBLEM — K74.60 UNSPECIFIED CIRRHOSIS OF LIVER (MULTI): Status: ACTIVE | Noted: 2022-08-04

## 2024-03-05 PROBLEM — M75.121 COMPLETE ROTATOR CUFF TEAR OR RUPTURE OF RIGHT SHOULDER, NOT SPECIFIED AS TRAUMATIC: Status: RESOLVED | Noted: 2023-06-12 | Resolved: 2024-03-05

## 2024-03-05 PROBLEM — M46.80: Status: ACTIVE | Noted: 2023-04-25

## 2024-03-05 PROBLEM — M67.911 UNSPECIFIED DISORDER OF SYNOVIUM AND TENDON, RIGHT SHOULDER: Status: ACTIVE | Noted: 2023-06-12

## 2024-03-05 PROBLEM — Z96.643 PRESENCE OF ARTIFICIAL HIP JOINT, BILATERAL: Status: ACTIVE | Noted: 2022-08-04

## 2024-03-05 PROBLEM — M25.511 PAIN IN RIGHT SHOULDER: Status: ACTIVE | Noted: 2023-06-12

## 2024-03-05 PROBLEM — Z79.899 OTHER LONG TERM (CURRENT) DRUG THERAPY: Status: ACTIVE | Noted: 2023-04-25

## 2024-03-05 PROBLEM — Z79.82 LONG TERM (CURRENT) USE OF ASPIRIN: Status: ACTIVE | Noted: 2023-04-25

## 2024-03-05 PROBLEM — K50.90 CROHN'S DISEASE (MULTI): Status: ACTIVE | Noted: 2023-04-25

## 2024-03-05 LAB
EST. AVERAGE GLUCOSE BLD GHB EST-MCNC: 123 MG/DL
HBA1C MFR BLD: 5.9 %
TSH SERPL-ACNC: 1.4 MIU/L (ref 0.44–3.98)

## 2024-03-05 PROCEDURE — 3074F SYST BP LT 130 MM HG: CPT | Performed by: PHYSICIAN ASSISTANT

## 2024-03-05 PROCEDURE — 3050F LDL-C >= 130 MG/DL: CPT | Performed by: PHYSICIAN ASSISTANT

## 2024-03-05 PROCEDURE — 4010F ACE/ARB THERAPY RXD/TAKEN: CPT | Performed by: PHYSICIAN ASSISTANT

## 2024-03-05 PROCEDURE — 99396 PREV VISIT EST AGE 40-64: CPT | Performed by: PHYSICIAN ASSISTANT

## 2024-03-05 PROCEDURE — 1036F TOBACCO NON-USER: CPT | Performed by: PHYSICIAN ASSISTANT

## 2024-03-05 PROCEDURE — 3078F DIAST BP <80 MM HG: CPT | Performed by: PHYSICIAN ASSISTANT

## 2024-03-05 ASSESSMENT — ENCOUNTER SYMPTOMS
SLEEP DISTURBANCE: 0
TREMORS: 0
CHEST TIGHTNESS: 0
ACTIVITY CHANGE: 0
ARTHRALGIAS: 1
NERVOUS/ANXIOUS: 0
MYALGIAS: 1
LIGHT-HEADEDNESS: 0
PALPITATIONS: 0
ABDOMINAL PAIN: 0
APPETITE CHANGE: 0
SHORTNESS OF BREATH: 0
CONSTIPATION: 0
DIARRHEA: 0
BLOOD IN STOOL: 0
NAUSEA: 0
SINUS PRESSURE: 0
INSOMNIA: 1
DIZZINESS: 0
FREQUENCY: 0
BACK PAIN: 1
COLOR CHANGE: 0
WHEEZING: 0

## 2024-03-05 ASSESSMENT — PATIENT HEALTH QUESTIONNAIRE - PHQ9
2. FEELING DOWN, DEPRESSED OR HOPELESS: NOT AT ALL
1. LITTLE INTEREST OR PLEASURE IN DOING THINGS: NOT AT ALL
SUM OF ALL RESPONSES TO PHQ9 QUESTIONS 1 AND 2: 0

## 2024-03-05 ASSESSMENT — PAIN SCALES - GENERAL: PAINLEVEL: 0-NO PAIN

## 2024-03-05 NOTE — PROGRESS NOTES
"Subjective   Patient ID: Melany Garcia is a 63 y.o. female who presents for Annual Exam (Would like follow up Xray for lungs/Previous provider was prescribing Ambien ).    Insomnia  This is a chronic problem. The current episode started more than 1 year ago. The problem has been unchanged (takes Ambien prn). Associated symptoms include arthralgias and myalgias. Pertinent negatives include no abdominal pain, chest pain or nausea.    She is having right rotator cuff surgery done tomorrow by Dr. Johnston.    Review of Systems   Constitutional:  Negative for activity change and appetite change.   HENT:  Negative for dental problem, postnasal drip and sinus pressure.    Eyes:  Negative for visual disturbance.   Respiratory:  Negative for chest tightness, shortness of breath and wheezing.    Cardiovascular:  Negative for chest pain, palpitations and leg swelling.   Gastrointestinal:  Negative for abdominal pain, blood in stool, constipation, diarrhea and nausea.   Endocrine: Negative for cold intolerance and heat intolerance.   Genitourinary:  Negative for frequency and urgency.   Musculoskeletal:  Positive for arthralgias, back pain and myalgias.   Skin:  Negative for color change.   Allergic/Immunologic: Negative for immunocompromised state.   Neurological:  Negative for dizziness, tremors and light-headedness.   Psychiatric/Behavioral:  Negative for behavioral problems and sleep disturbance. The patient has insomnia. The patient is not nervous/anxious.        Objective   /74   Pulse 69   Ht 1.727 m (5' 8\")   Wt 104 kg (228 lb 6.4 oz)   SpO2 96%   BMI 34.73 kg/m²     Physical Exam  Constitutional:       Appearance: She is obese.   HENT:      Right Ear: Tympanic membrane normal.      Left Ear: Tympanic membrane normal.      Nose: Nose normal.      Mouth/Throat:      Mouth: Mucous membranes are moist.   Cardiovascular:      Rate and Rhythm: Normal rate and regular rhythm.      Pulses: Normal pulses. "   Pulmonary:      Effort: Pulmonary effort is normal. No respiratory distress.   Abdominal:      General: Bowel sounds are normal.      Tenderness: There is no abdominal tenderness.   Musculoskeletal:      Cervical back: Normal range of motion. No tenderness.      Right lower leg: No edema.      Left lower leg: No edema.   Skin:     General: Skin is warm.   Neurological:      General: No focal deficit present.      Mental Status: She is alert. Mental status is at baseline.   Psychiatric:         Mood and Affect: Mood normal.         Thought Content: Thought content normal.         Judgment: Judgment normal.         Assessment/Plan   Diagnoses and all orders for this visit:  Routine general medical examination at a health care facility  -     Hemoglobin A1C; Future  -     TSH with reflex to Free T4 if abnormal; Future  Vitamin D deficiency  Supraventricular tachycardia  -     TSH with reflex to Free T4 if abnormal; Future  Rapid atrial fibrillation (CMS/HCC)  Obstructive sleep apnea syndrome  -     Hemoglobin A1C; Future  Gastroesophageal reflux disease without esophagitis  Primary hypertension  Other hyperlipidemia  -     TSH with reflex to Free T4 if abnormal; Future  Anemia, unspecified type  Lung nodule  -     CT chest wo IV contrast; Future  Other orders  -     Follow Up In Primary Care - Health Maintenance  She had a lung nodule that was seen on her CT angio done last year.  Is due for that next month.  She sees Dr. BARONE on regular basis for her A fib.  She is not able to currently exercise but has been trying to monitor her diet especially can.  Discussed possibly switching her Ambien to Hydroxyzine due to it can be used prn and is not a  controlled medication. She will call for an rx when she is due.

## 2024-03-06 ENCOUNTER — ANESTHESIA EVENT (OUTPATIENT)
Dept: OPERATING ROOM | Facility: HOSPITAL | Age: 64
End: 2024-03-06
Payer: COMMERCIAL

## 2024-03-07 ENCOUNTER — APPOINTMENT (OUTPATIENT)
Dept: UROLOGY | Facility: CLINIC | Age: 64
End: 2024-03-07
Payer: COMMERCIAL

## 2024-03-07 RX ORDER — CEFAZOLIN SODIUM 2 G/50ML
2 SOLUTION INTRAVENOUS ONCE
Status: CANCELLED | OUTPATIENT
Start: 2024-03-07 | End: 2024-03-07

## 2024-03-11 ENCOUNTER — ANESTHESIA (OUTPATIENT)
Dept: OPERATING ROOM | Facility: HOSPITAL | Age: 64
End: 2024-03-11
Payer: COMMERCIAL

## 2024-03-11 ENCOUNTER — HOME HEALTH ADMISSION (OUTPATIENT)
Dept: HOME HEALTH SERVICES | Facility: HOME HEALTH | Age: 64
End: 2024-03-11
Payer: COMMERCIAL

## 2024-03-11 ENCOUNTER — HOSPITAL ENCOUNTER (OUTPATIENT)
Facility: HOSPITAL | Age: 64
Setting detail: OUTPATIENT SURGERY
Discharge: HOME | End: 2024-03-11
Attending: ORTHOPAEDIC SURGERY | Admitting: ORTHOPAEDIC SURGERY
Payer: COMMERCIAL

## 2024-03-11 ENCOUNTER — PHARMACY VISIT (OUTPATIENT)
Dept: PHARMACY | Facility: CLINIC | Age: 64
End: 2024-03-11
Payer: COMMERCIAL

## 2024-03-11 ENCOUNTER — DOCUMENTATION (OUTPATIENT)
Dept: HOME HEALTH SERVICES | Facility: HOME HEALTH | Age: 64
End: 2024-03-11

## 2024-03-11 VITALS
RESPIRATION RATE: 16 BRPM | OXYGEN SATURATION: 92 % | SYSTOLIC BLOOD PRESSURE: 132 MMHG | WEIGHT: 229.28 LBS | TEMPERATURE: 96.8 F | DIASTOLIC BLOOD PRESSURE: 78 MMHG | HEIGHT: 68 IN | HEART RATE: 60 BPM | BODY MASS INDEX: 34.75 KG/M2

## 2024-03-11 DIAGNOSIS — M19.011 PRIMARY OSTEOARTHRITIS, RIGHT SHOULDER: Primary | ICD-10-CM

## 2024-03-11 DIAGNOSIS — Z79.82 LONG TERM (CURRENT) USE OF ASPIRIN: ICD-10-CM

## 2024-03-11 LAB
GLUCOSE BLD MANUAL STRIP-MCNC: 102 MG/DL (ref 74–99)
GLUCOSE BLD MANUAL STRIP-MCNC: 122 MG/DL (ref 74–99)

## 2024-03-11 PROCEDURE — 7100000001 HC RECOVERY ROOM TIME - INITIAL BASE CHARGE: Performed by: ORTHOPAEDIC SURGERY

## 2024-03-11 PROCEDURE — 3700000002 HC GENERAL ANESTHESIA TIME - EACH INCREMENTAL 1 MINUTE: Performed by: ORTHOPAEDIC SURGERY

## 2024-03-11 PROCEDURE — 7100000009 HC PHASE TWO TIME - INITIAL BASE CHARGE: Performed by: ORTHOPAEDIC SURGERY

## 2024-03-11 PROCEDURE — 2500000005 HC RX 250 GENERAL PHARMACY W/O HCPCS: Performed by: NURSE PRACTITIONER

## 2024-03-11 PROCEDURE — 2500000005 HC RX 250 GENERAL PHARMACY W/O HCPCS: Performed by: ANESTHESIOLOGY

## 2024-03-11 PROCEDURE — 3700000001 HC GENERAL ANESTHESIA TIME - INITIAL BASE CHARGE: Performed by: ORTHOPAEDIC SURGERY

## 2024-03-11 PROCEDURE — 82947 ASSAY GLUCOSE BLOOD QUANT: CPT

## 2024-03-11 PROCEDURE — A29824 PR SHLDR ARTHROSCOP,SURG,DIS CLAVICULECTOMY: Performed by: NURSE ANESTHETIST, CERTIFIED REGISTERED

## 2024-03-11 PROCEDURE — 2500000004 HC RX 250 GENERAL PHARMACY W/ HCPCS (ALT 636 FOR OP/ED): Performed by: ANESTHESIOLOGY

## 2024-03-11 PROCEDURE — 3600000004 HC OR TIME - INITIAL BASE CHARGE - PROCEDURE LEVEL FOUR: Performed by: ORTHOPAEDIC SURGERY

## 2024-03-11 PROCEDURE — 2500000004 HC RX 250 GENERAL PHARMACY W/ HCPCS (ALT 636 FOR OP/ED): Performed by: NURSE ANESTHETIST, CERTIFIED REGISTERED

## 2024-03-11 PROCEDURE — 7100000002 HC RECOVERY ROOM TIME - EACH INCREMENTAL 1 MINUTE: Performed by: ORTHOPAEDIC SURGERY

## 2024-03-11 PROCEDURE — 2780000003 HC OR 278 NO HCPCS: Performed by: ORTHOPAEDIC SURGERY

## 2024-03-11 PROCEDURE — RXMED WILLOW AMBULATORY MEDICATION CHARGE

## 2024-03-11 PROCEDURE — 7100000010 HC PHASE TWO TIME - EACH INCREMENTAL 1 MINUTE: Performed by: ORTHOPAEDIC SURGERY

## 2024-03-11 PROCEDURE — 3600000009 HC OR TIME - EACH INCREMENTAL 1 MINUTE - PROCEDURE LEVEL FOUR: Performed by: ORTHOPAEDIC SURGERY

## 2024-03-11 PROCEDURE — 2500000005 HC RX 250 GENERAL PHARMACY W/O HCPCS: Performed by: ORTHOPAEDIC SURGERY

## 2024-03-11 PROCEDURE — 2720000007 HC OR 272 NO HCPCS: Performed by: ORTHOPAEDIC SURGERY

## 2024-03-11 PROCEDURE — 2500000005 HC RX 250 GENERAL PHARMACY W/O HCPCS: Performed by: NURSE ANESTHETIST, CERTIFIED REGISTERED

## 2024-03-11 RX ORDER — DOCUSATE SODIUM 100 MG/1
100 CAPSULE, LIQUID FILLED ORAL 2 TIMES DAILY
Qty: 20 CAPSULE | Refills: 0 | Status: SHIPPED | OUTPATIENT
Start: 2024-03-11 | End: 2024-03-21

## 2024-03-11 RX ORDER — SODIUM CHLORIDE, SODIUM LACTATE, POTASSIUM CHLORIDE, CALCIUM CHLORIDE 600; 310; 30; 20 MG/100ML; MG/100ML; MG/100ML; MG/100ML
100 INJECTION, SOLUTION INTRAVENOUS CONTINUOUS
Status: DISCONTINUED | OUTPATIENT
Start: 2024-03-11 | End: 2024-03-11 | Stop reason: HOSPADM

## 2024-03-11 RX ORDER — HYDROCODONE BITARTRATE AND ACETAMINOPHEN 5; 325 MG/1; MG/1
1 TABLET ORAL EVERY 4 HOURS PRN
Qty: 30 TABLET | Refills: 0 | Status: SHIPPED | OUTPATIENT
Start: 2024-03-11 | End: 2024-03-16

## 2024-03-11 RX ORDER — KETOROLAC TROMETHAMINE 30 MG/ML
INJECTION, SOLUTION INTRAMUSCULAR; INTRAVENOUS AS NEEDED
Status: DISCONTINUED | OUTPATIENT
Start: 2024-03-11 | End: 2024-03-11

## 2024-03-11 RX ORDER — PROPOFOL 10 MG/ML
INJECTION, EMULSION INTRAVENOUS AS NEEDED
Status: DISCONTINUED | OUTPATIENT
Start: 2024-03-11 | End: 2024-03-11

## 2024-03-11 RX ORDER — ROCURONIUM BROMIDE 10 MG/ML
INJECTION, SOLUTION INTRAVENOUS AS NEEDED
Status: DISCONTINUED | OUTPATIENT
Start: 2024-03-11 | End: 2024-03-11

## 2024-03-11 RX ORDER — FENTANYL CITRATE 50 UG/ML
INJECTION, SOLUTION INTRAMUSCULAR; INTRAVENOUS AS NEEDED
Status: DISCONTINUED | OUTPATIENT
Start: 2024-03-11 | End: 2024-03-11

## 2024-03-11 RX ORDER — OXYCODONE HYDROCHLORIDE 5 MG/1
5 TABLET ORAL EVERY 4 HOURS PRN
Status: DISCONTINUED | OUTPATIENT
Start: 2024-03-11 | End: 2024-03-11 | Stop reason: HOSPADM

## 2024-03-11 RX ORDER — GLYCINE 1.5 G/100ML
IRRIGANT IRRIGATION AS NEEDED
Status: DISCONTINUED | OUTPATIENT
Start: 2024-03-11 | End: 2024-03-11 | Stop reason: HOSPADM

## 2024-03-11 RX ORDER — CEFAZOLIN 1 G/1
INJECTION, POWDER, FOR SOLUTION INTRAVENOUS AS NEEDED
Status: DISCONTINUED | OUTPATIENT
Start: 2024-03-11 | End: 2024-03-11

## 2024-03-11 RX ORDER — ACETAMINOPHEN 325 MG/1
975 TABLET ORAL ONCE
Status: COMPLETED | OUTPATIENT
Start: 2024-03-11 | End: 2024-03-11

## 2024-03-11 RX ORDER — MIDAZOLAM HYDROCHLORIDE 1 MG/ML
INJECTION INTRAMUSCULAR; INTRAVENOUS AS NEEDED
Status: DISCONTINUED | OUTPATIENT
Start: 2024-03-11 | End: 2024-03-11

## 2024-03-11 RX ORDER — ONDANSETRON HYDROCHLORIDE 2 MG/ML
INJECTION, SOLUTION INTRAVENOUS AS NEEDED
Status: DISCONTINUED | OUTPATIENT
Start: 2024-03-11 | End: 2024-03-11

## 2024-03-11 RX ORDER — DROPERIDOL 2.5 MG/ML
0.62 INJECTION, SOLUTION INTRAMUSCULAR; INTRAVENOUS ONCE AS NEEDED
Status: DISCONTINUED | OUTPATIENT
Start: 2024-03-11 | End: 2024-03-11 | Stop reason: HOSPADM

## 2024-03-11 RX ORDER — LIDOCAINE HCL/PF 100 MG/5ML
SYRINGE (ML) INTRAVENOUS AS NEEDED
Status: DISCONTINUED | OUTPATIENT
Start: 2024-03-11 | End: 2024-03-11

## 2024-03-11 RX ORDER — ONDANSETRON HYDROCHLORIDE 2 MG/ML
4 INJECTION, SOLUTION INTRAVENOUS ONCE AS NEEDED
Status: DISCONTINUED | OUTPATIENT
Start: 2024-03-11 | End: 2024-03-11 | Stop reason: HOSPADM

## 2024-03-11 RX ORDER — MIDAZOLAM HYDROCHLORIDE 1 MG/ML
INJECTION, SOLUTION INTRAMUSCULAR; INTRAVENOUS AS NEEDED
Status: DISCONTINUED | OUTPATIENT
Start: 2024-03-11 | End: 2024-03-11

## 2024-03-11 RX ORDER — DEXAMETHASONE SODIUM PHOSPHATE 4 MG/ML
INJECTION, SOLUTION INTRA-ARTICULAR; INTRALESIONAL; INTRAMUSCULAR; INTRAVENOUS; SOFT TISSUE AS NEEDED
Status: DISCONTINUED | OUTPATIENT
Start: 2024-03-11 | End: 2024-03-11

## 2024-03-11 RX ADMIN — ACETAMINOPHEN 975 MG: 325 TABLET ORAL at 07:40

## 2024-03-11 RX ADMIN — SODIUM CHLORIDE, POTASSIUM CHLORIDE, SODIUM LACTATE AND CALCIUM CHLORIDE 100 ML/HR: 600; 310; 30; 20 INJECTION, SOLUTION INTRAVENOUS at 07:40

## 2024-03-11 RX ADMIN — MIDAZOLAM 2 MG: 1 INJECTION INTRAMUSCULAR; INTRAVENOUS at 08:30

## 2024-03-11 RX ADMIN — LIDOCAINE HYDROCHLORIDE 60 MG: 20 INJECTION INTRAVENOUS at 09:01

## 2024-03-11 RX ADMIN — PROPOFOL 50 MG: 10 INJECTION, EMULSION INTRAVENOUS at 09:06

## 2024-03-11 RX ADMIN — Medication: at 10:50

## 2024-03-11 RX ADMIN — ROCURONIUM 60 MG: 100 INJECTION, SOLUTION INTRAVENOUS at 09:01

## 2024-03-11 RX ADMIN — CEFAZOLIN 2 G: 1 INJECTION, POWDER, FOR SOLUTION INTRAMUSCULAR; INTRAVENOUS at 09:10

## 2024-03-11 RX ADMIN — FENTANYL CITRATE 25 MCG: 50 INJECTION, SOLUTION INTRAMUSCULAR; INTRAVENOUS at 11:11

## 2024-03-11 RX ADMIN — FENTANYL CITRATE 25 MCG: 50 INJECTION, SOLUTION INTRAMUSCULAR; INTRAVENOUS at 08:30

## 2024-03-11 RX ADMIN — PROPOFOL 150 MG: 10 INJECTION, EMULSION INTRAVENOUS at 09:01

## 2024-03-11 RX ADMIN — SUGAMMADEX 200 MG: 100 INJECTION, SOLUTION INTRAVENOUS at 10:22

## 2024-03-11 RX ADMIN — MIDAZOLAM HYDROCHLORIDE 2 MG: 1 INJECTION, SOLUTION INTRAMUSCULAR; INTRAVENOUS at 09:00

## 2024-03-11 RX ADMIN — DEXAMETHASONE SODIUM PHOSPHATE 8 MG: 4 INJECTION INTRA-ARTICULAR; INTRALESIONAL; INTRAMUSCULAR; INTRAVENOUS; SOFT TISSUE at 09:15

## 2024-03-11 RX ADMIN — SODIUM CHLORIDE, POTASSIUM CHLORIDE, SODIUM LACTATE AND CALCIUM CHLORIDE: 600; 310; 30; 20 INJECTION, SOLUTION INTRAVENOUS at 09:53

## 2024-03-11 RX ADMIN — POVIDONE-IODINE 1 APPLICATION: 5 SOLUTION TOPICAL at 07:41

## 2024-03-11 RX ADMIN — ONDANSETRON 4 MG: 2 INJECTION INTRAMUSCULAR; INTRAVENOUS at 09:15

## 2024-03-11 RX ADMIN — FENTANYL CITRATE 50 MCG: 50 INJECTION, SOLUTION INTRAMUSCULAR; INTRAVENOUS at 09:00

## 2024-03-11 RX ADMIN — KETOROLAC TROMETHAMINE 30 MG: 30 INJECTION, SOLUTION INTRAMUSCULAR at 10:14

## 2024-03-11 ASSESSMENT — PAIN SCALES - GENERAL
PAINLEVEL_OUTOF10: 0 - NO PAIN

## 2024-03-11 ASSESSMENT — PAIN - FUNCTIONAL ASSESSMENT
PAIN_FUNCTIONAL_ASSESSMENT: 0-10

## 2024-03-11 ASSESSMENT — COLUMBIA-SUICIDE SEVERITY RATING SCALE - C-SSRS
1. IN THE PAST MONTH, HAVE YOU WISHED YOU WERE DEAD OR WISHED YOU COULD GO TO SLEEP AND NOT WAKE UP?: NO
6. HAVE YOU EVER DONE ANYTHING, STARTED TO DO ANYTHING, OR PREPARED TO DO ANYTHING TO END YOUR LIFE?: NO
2. HAVE YOU ACTUALLY HAD ANY THOUGHTS OF KILLING YOURSELF?: NO

## 2024-03-11 NOTE — ANESTHESIA PREPROCEDURE EVALUATION
Patient: Melany Garcia    Procedure Information       Anesthesia Start Date/Time: 03/11/24 0853    Procedures:       DECOMPRESSION ARTHROSCOPY SUBACROMIAL (Right: Shoulder)      ARTHROSCOPY SHOULDER (Right: Shoulder)      REPAIR ARTHROSCOPY SHOULDER RCR (Right: Shoulder)    Location: [facility] / [facility]    Surgeons: Romulo Guillen, DO            Relevant Problems   Cardiovascular   (+) Chronic atrial fibrillation, unspecified (CMS/HCC)   (+) Hyperlipidemia   (+) Hypertension   (+) Rapid atrial fibrillation (CMS/HCC)   (+) Supraventricular tachycardia      Endocrine   (+) Diabetic nephropathy (CMS/HCC)   (+) Diabetic retinopathy (CMS/HCC)      GI   (+) Crohn's disease (CMS/HCC)   (+) Gastroesophageal reflux disease      /Renal   (+) Diabetic nephropathy (CMS/HCC)   (+) Hepatic cirrhosis (CMS/HCC)   (+) Steatosis of liver   (+) Unspecified cirrhosis of liver (CMS/HCC)      Neuro/Psych   (+) Anxiety      Pulmonary   (+) Obstructive sleep apnea syndrome      GI/Hepatic   (+) Hepatic cirrhosis (CMS/HCC)   (+) Steatosis of liver   (+) Unspecified cirrhosis of liver (CMS/HCC)      Hematology   (+) Anemia   (+) Iron deficiency anemia      Musculoskeletal   (+) Primary osteoarthritis, right shoulder      Other   (+) Chronic gout of right ankle   (+) Chronic gout of right foot   (+) Splenomegaly       Clinical information reviewed:   Tobacco  Allergies  Meds   Med Hx  Surg Hx   Fam Hx          NPO Detail:  NPO/Void Status  Date of Last Liquid: 03/11/24  Time of Last Liquid: 0530  Date of Last Solid: 03/10/24  Time of Last Solid: 2000  Last Intake Type: Food         Physical Exam    Airway  Mallampati: III     Cardiovascular   Rhythm: regular  Rate: normal     Dental   Comments: #8 or #9 has been repaired per patient but does not remember which    Pulmonary    Abdominal            Anesthesia Plan    History of general anesthesia?: yes  History of complications of general anesthesia?: no    ASA 3      general     Anesthetic plan and risks discussed with patient.

## 2024-03-11 NOTE — HH CARE COORDINATION
Home Care received a Referral for Physical Therapy. We have processed the referral for a Start of Care on 03/12.     If you have any questions or concerns, please feel free to contact us at [phone]. Follow the prompts, enter your five digit zip code, and you will be directed to your care team on [facility].

## 2024-03-11 NOTE — ANESTHESIA PROCEDURE NOTES
Airway  Date/Time: 3/11/2024 9:05 AM  Urgency: elective    Airway not difficult    Staffing  Performed: CRNA   Authorized by: DODIE Christine    Performed by: DODIE Christine  Patient location during procedure: OR    Indications and Patient Condition  Indications for airway management: anesthesia  Spontaneous Ventilation: absent  Sedation level: deep  Preoxygenated: yes  Patient position: sniffing  Mask difficulty assessment: 1 - vent by mask    Final Airway Details  Final airway type: endotracheal airway      Successful airway: ETT  Cuffed: yes   Successful intubation technique: video laryngoscopy  Facilitating devices/methods: intubating stylet  Endotracheal tube insertion site: oral  Blade: Key  Blade size: #3  ETT size (mm): 7.5  Cormack-Lehane Classification: grade I - full view of glottis  Placement verified by: chest auscultation and capnometry   Measured from: lips  ETT to lips (cm): 22  Number of attempts at approach: 1  Number of other approaches attempted: 0    Additional Comments  Dentition same as preop after laryngoscopy with Joy and intubation.

## 2024-03-11 NOTE — DISCHARGE INSTRUCTIONS
POST OPERATIVE FOLLOW UP: 3/19/24 at 0830 [facility], [facility].                          Romulo Guillen D.O.  Phone: [phone]    POSTOPERATIVE INSTRUCTIONS:    PAIN, SWELLING & BRUISING  Some pain, stiffness and swelling is normal after surgery.  Pain will start to let up over time depending on your activity level.  It is preferable to rest for 24 hours following your surgery  Pain is often delayed for 24-48 hours after surgery.  Pain may be dull/achy, throbbing, or even sharp/nerve sensations    WOUND CARE INSTRUCTIONS  Your surgical bandage will be removed 2 weeks after surgery at your post-operative visit.  If your bandage becomes compromised, begins to come off before then, or soaks through with drainage call the office immediately.  You may have sutures under the skin that dissolve on their own over time.    As the sutures absorb occasionally a small suture abscess can develop, this is not uncommon for up to 6 weeks, if this occurs please notify your surgeon immediately.    HYGIENE  You may shower 48 hours after your surgery, provided the Mepilex silver dressing is in place.  No tub bathing or submerging underwater.  Do not scrub directly over the surgical bandage  Do not use any creams, lotions or ointments on the surgical leg for 4 weeks after surgery, or until you have been cleared to do so by your surgeon    GENERAL INSTRUCTIONS  Do not drink alcoholic beverages (beer and wine included) for 24 hours following your surgery, or while you are taking narcotic pain medications  Delay making important decisions until you are fully recovered  You cannot swim or submerge in water for at least 6 weeks after surgery, or until you are cleared by your surgeon.  You may start kneeling 3 months after knee replacement surgery, once you have been cleared by your surgeon.  This may not ever feel “normal” or comfortable    HOME DIET  Resume your normal diet after surgery. If you  are on a specific type of diet for your condition, resume that instead.    Choose foods that help promote good bowel habits and prevent constipation, such as foods high in fiber.          POSTOPERATIVE MEDICATIONS    Pain medications have been ordered to help manage pain throughout recovery.  While you are using narcotic pain medication, you should be using a stool softener or laxative to prevent constipation.  My preference is parallax powder once or twice a day when taking the narcotic pain medicine  It is important to eat a small meal or snack before taking pain medications to avoid nausea or stomach upset.    MEDICATION REFILLS - [phone]    If you need to request a medication refill, please call the office between 8:30am-4:30pm, Monday through Friday.    Any calls received outside of this timeframe will be handled on the next business day.    Medication requests received on Saturday or Sunday will be handled on Monday.    Please allow 3-5 business days for all medication requests to be processed.    RESTARTING HOME MEDICATIONS  You may restart your home medications the following day after your surgery UNLESS you have been given alternate instructions.    Follow the instructions given to you on your hospital discharge instructions for more information regarding your home medications.  DRIVING & TRAVEL  Your surgeon will address this at your post-op appointment.  You must not be taking narcotic pain medication to be cleared to drive    PHYSICAL THERAPY    Following surgery it is important to progress through recovery with in-home or outpatient physical therapy.  You should continue to complete home exercises provided from the hospital on days that you are not working with a physical therapist.  It is common to have a temporary increase in pain and swelling upon starting outpatient physical therapy and/or changing your exercise routine.  Continue to use ice to help with symptoms.      EMERGENCIES & WHEN TO  CALL YOUR SURGEON  When to contact our office immediately:  Fever >101.5 for at least 48 hours after surgery or chills.  Excessive bleeding from incision(s). A small amount of drainage is normal and expected.  Signs of infection of incision(s)-excessive drainage that is soaking through your dressing (especially if it is pus-like), redness that is spreading out from the edges of your incision, or increased warmth around the area.  Excruciating pain for which the pain medication, taken as instructed, is not helping.  Severe calf pain or arm pain  Go directly to the emergency room or call 911, if you are experiencing chest pain or difficulty breathing.    ICE/COLD THERAPY  Ice is most important during the first 2 weeks after surgery, but should be used for several weeks as needed.  Never place ice, or cold therapy devices directly on the skin.  You should always have a protective layer between your skin and the cold.  You have been prescribed to ice your total joint at a minimum of twice per hour for 20 minutes while awake during the first 6 weeks after surgery if you are using ice packs. This will help with pain control.  If you are using an ice machine, please follow ice machine instructions.  After knee replacement is extremely important to elevate the leg straight on an incline, not flat.

## 2024-03-11 NOTE — OP NOTE
RIGHT TOTAL KNEE ARTHROPLASTY     Date: 3/11/2024   OR Location: [facility] OR    Name: Melany Garcia  : 1960    MRN: [id number]    Diagnosis  Pre-op Diagnosis     * Traumatic complete tear of right rotator cuff, initial encounter [S46.011A]  Post-op Diagnosis     * Traumatic complete tear of right rotator cuff, initial encounter [S46.011A]      Procedures  DECOMPRESSION ARTHROSCOPY SUBACROMIAL  01552 - ID SURGICAL ARTHROSCOPY RACHELLE W/CORACOACRM LIGM RLS    ARTHROSCOPY SHOULDER  06341 - ID SURGICAL ARTHROSCOPY SHOULDER DSTL CLAVICULC    REPAIR ARTHROSCOPY SHOULDER RCR  27374 - ID SURGICAL ARTHROSCOPY SHOULDER W/ROTATOR CUFF RPR      Surgeons      * Romulo Guillen - Primary     Specimen: none.    Staff: Circulator: Radha Hodge RN  Scrub Person: Jyothi Saeed     Drains and/or Catheters: none    Estimated Blood Loss: 5 cc    Resident/Fellow/Other Assistant:  Surgeon(s) and Role:     Procedure Summary  Anesthesia: Consult    Anesthesia Staff: CRNA: STELLA Christine-CRNA   ASA: ASA status not filed in the log.       Intra-op Medications:   - 1 Gram Tranexamic acid prior to surgical incision  - 1 Gram Tranexamic acid at start of incision close  - ALEXANDER Pain cockail: ropivacaine-epinephrine-clonidine-ketorolac 2.46-0.005- 0.0008-0.3mg/mL periarticular syringe: 50 cc    IMPLANTS:   Nothing was implanted during the procedure    Findings: See operative note    Operative Indications:  Melany Garcia is a patient that is well-known to me and initially presented as an outpatient. They have been treated for advanced knee osteoarthritis with therapy, anti-inflammatories, and activity modification, all without improvement of symptoms. They are here for surgery for Pre-op Diagnosis     * Traumatic complete tear of right rotator cuff, initial encounter [S46.011A].     Patient complained of pain and weakness in the right shoulder.  She had positive MRI and physical exam findings to confirm rotator cuff tear.   Having failed conservative options, she elected to proceed with rotator cuff surgery.    On the day of surgery the site of surgery was properly noted/marked if necessary per policy. The patient has been actively warmed in preoperative area. Preoperative antibiotics were confirmed and started prior to surgical incision. Venous thrombosis prophylaxis have been ordered including bilateral sequential compression devices to start in pre-operative area.     At the time of the procedure advanced arthritic changes were noted at the acromioclavicular joint.  There was spurring of the anterior edge of the acromion.  Chronic bursitis was noted.  Large rotator cuff tear involving the supraspinatus tendon was appreciated.    Procedure:     Patient was brought to the operative suite satisfactory general and interscalene block anesthetic was administered by department anesthesia.  Patient was placed in laterally, position with the right shoulder up.  15 pounds of trapeze traction was used.    The shoulder area was sterilely draped and prepped free in routine surgical fashion.  Standard arthroscopic ports were carried out in the following fashion posteriorly for camera insertion anteriorly and laterally for instrumentation.    The shoulder was visualized finding bicipital tendinitis and minimal arthritis in the glenohumeral joint grade 1 changes were noted.  Full-thickness rotator cuff tear of the supraspinatus was appreciated with about 1.5 cm of retraction.    A 4.2 mm shaver was used to debride the glenohumeral joint.  The scope was then advanced into the subacromial space.    The bursa was excised.  Electrocautery was used to release the coracoacromial ligament.    A 6.0 acromionizer bur was used to perform acromioplasty and perform a distal clavicle resection.  Approximately 1 cm distal clavicle was resected.    An excellent decompression was accomplished.    A Hamlin anchor was delivered into the lateral aspect of the shoulder  near the greater tuberosity.  Sutures were passed with rotator cuff in horizontal mattress fashion.  Knot-tying techniques were used to secure the rotator cuff to bone.    A 4.75 mm Kendra anchor was used as a lateral anchor.  Shoulder was taken through full range of motion found of no further impingement and good apposition of the rotator cuff repair.    Irrigation was performed arthroscopic instrumentation was removed.    Skin incisions were closed using 3-0 Prolene sutures.  Bacitracin impregnated Adaptic and a bulky shoulder dressing was applied.  Shoulder immobilizer was placed.    Patient was transferred to recovery having tolerated procedure well.                              Romulo Guillen D.O.      This note was dictated using speech recognition software and was not corrected for spelling or grammatical errors

## 2024-03-11 NOTE — ANESTHESIA POSTPROCEDURE EVALUATION
Patient: Melany Garcia    Procedure Summary       Date: 03/11/24 Room / Location: [facility] / [facility]    Anesthesia Start: 0853 Anesthesia Stop: 1037    Procedures:       DECOMPRESSION ARTHROSCOPY SUBACROMIAL (Right: Shoulder)      ARTHROSCOPY SHOULDER (Right: Shoulder)      REPAIR ARTHROSCOPY SHOULDER RCR (Right: Shoulder) Diagnosis:       Traumatic complete tear of right rotator cuff, initial encounter      (M75.121)    Surgeons: Romulo Guillen DO Responsible Provider: DODIE Christine    Anesthesia Type: general ASA Status: 3            Anesthesia Type: general    Vitals Value Taken Time   /78 03/11/24 1302   Temp 36 °C (96.8 °F) 03/11/24 1035   Pulse 60 03/11/24 1300   Resp 16 03/11/24 1300   SpO2 92 % 03/11/24 1315   Vitals shown include unvalidated device data.    Anesthesia Post Evaluation    Patient location during evaluation: PACU  Patient participation: complete - patient participated  Level of consciousness: awake  Pain management: adequate  Multimodal analgesia pain management approach  Airway patency: patent  Two or more strategies used to mitigate risk of obstructive sleep apnea  Cardiovascular status: acceptable  Respiratory status: acceptable  Hydration status: acceptable  Postoperative Nausea and Vomiting: none        No notable events documented.

## 2024-03-12 ENCOUNTER — HOME CARE VISIT (OUTPATIENT)
Dept: HOME HEALTH SERVICES | Facility: HOME HEALTH | Age: 64
End: 2024-03-12
Payer: COMMERCIAL

## 2024-03-12 VITALS
RESPIRATION RATE: 16 BRPM | HEIGHT: 68 IN | SYSTOLIC BLOOD PRESSURE: 142 MMHG | TEMPERATURE: 96.8 F | WEIGHT: 227 LBS | OXYGEN SATURATION: 97 % | DIASTOLIC BLOOD PRESSURE: 90 MMHG | HEART RATE: 62 BPM | BODY MASS INDEX: 34.4 KG/M2

## 2024-03-12 PROCEDURE — G0151 HHCP-SERV OF PT,EA 15 MIN: HCPCS

## 2024-03-12 PROCEDURE — 0023 HH SOC

## 2024-03-12 SDOH — HEALTH STABILITY: PHYSICAL HEALTH
EXERCISE COMMENTS: EDUCATED TO PERFORM AROM R ELBOW WRIST AND HAND IN SEATED POSITION WITH ARM AT HER SIDE, NO ACTIVE SHOULDER MOTION  OR TO PERFORM IN BED ,RIGHT ARM STABILIZED TO HER TRUNK. WILL LEAVE INSTRUCTIONS FOR SON TO PERFORM PROM R SHOULDER.

## 2024-03-12 SDOH — HEALTH STABILITY: PHYSICAL HEALTH: EXERCISE TYPE: AROM R ELBOW WRIST AND HAND

## 2024-03-12 ASSESSMENT — ACTIVITIES OF DAILY LIVING (ADL)
OASIS_M1830: 01
ENTERING_EXITING_HOME: SUPERVISION
AMBULATION_DISTANCE/DURATION_TOLERATED: 200
GROOMING_CURRENT_FUNCTION: SUPERVISION
GROOMING ASSESSED: 1
AMBULATION ASSISTANCE ON FLAT SURFACES: 1

## 2024-03-12 ASSESSMENT — ENCOUNTER SYMPTOMS
PAIN LOCATION: RIGHT SHOULDER
LIMITED RANGE OF MOTION: 1
PAIN: 1
HIGHEST PAIN SEVERITY IN PAST 24 HOURS: 2/10
MUSCLE WEAKNESS: 1
PAIN SEVERITY GOAL: 0/10
HYPERTENSION: 1
LOWEST PAIN SEVERITY IN PAST 24 HOURS: 0/10
PERSON REPORTING PAIN: PATIENT
PAIN LOCATION - PAIN FREQUENCY: INTERMITTENT
PAIN LOCATION - RELIEVING FACTORS: ICE AND MEDS
SUBJECTIVE PAIN PROGRESSION: WAXING AND WANING
PAIN LOCATION - PAIN QUALITY: TENDERNESS
PAIN LOCATION - PAIN SEVERITY: 1/10

## 2024-03-15 ENCOUNTER — HOME CARE VISIT (OUTPATIENT)
Dept: HOME HEALTH SERVICES | Facility: HOME HEALTH | Age: 64
End: 2024-03-15
Payer: COMMERCIAL

## 2024-03-15 PROCEDURE — G0151 HHCP-SERV OF PT,EA 15 MIN: HCPCS

## 2024-03-15 SDOH — HEALTH STABILITY: PHYSICAL HEALTH: EXERCISE TYPE: PROM R SHOULDER

## 2024-03-15 SDOH — HEALTH STABILITY: PHYSICAL HEALTH
EXERCISE COMMENTS: NEEDS ASSIST FOR PROM R SHOULDER FLEXION , ABDUCTION AND IR ER. EXERCISES PERFORMED AND HANDOUT ISSUED FOR FAMILY TO ASSIST, NO ONE HOME TO INSTRUCT IN PROM EXERCISES. PATIENT ABLE TO PERFORM ACTIVE ELBOW FLEX/EXT AND WRIST AND HAND FLEX/EXT   PROM R

## 2024-03-15 SDOH — HEALTH STABILITY: PHYSICAL HEALTH: EXERCISE COMMENTS: IGHT SHOULDER FLEXION AND ABDUCTION TO 75 DEGREES.

## 2024-03-15 ASSESSMENT — ENCOUNTER SYMPTOMS
PERSON REPORTING PAIN: PATIENT
PAIN LOCATION - PAIN QUALITY: DULL AND ACHY
MUSCLE WEAKNESS: 1
PAIN: SLEEPING IN RECLINER
SUBJECTIVE PAIN PROGRESSION: WAXING AND WANING
PAIN LOCATION - PAIN SEVERITY: 1/10
LIMITED RANGE OF MOTION: 1
PAIN LOCATION: RIGHT SHOULDER
PAIN LOCATION - RELIEVING FACTORS: ICE AND MEDS
PAIN: 1
HIGHEST PAIN SEVERITY IN PAST 24 HOURS: 4/10

## 2024-03-15 ASSESSMENT — ACTIVITIES OF DAILY LIVING (ADL)
CURRENT_FUNCTION: INDEPENDENT
AMBULATION ASSISTANCE: 1
GROOMING ASSESSED: 1
TOILETING: INDEPENDENT
GROOMING_CURRENT_FUNCTION: INDEPENDENT
HOME_HEALTH_OASIS: 00
AMBULATION ASSISTANCE: INDEPENDENT
TOILETING: 1
OASIS_M1830: 00
PHYSICAL TRANSFERS ASSESSED: 1
DRESSING_UB_CURRENT_FUNCTION: INDEPENDENT

## 2024-03-15 NOTE — Clinical Note
Patient dc from homecare, not homebound ,insurance will not cover and PROM not a skilled PT need. DId instruct patient in having family member perform PROM  and issued handout and did demonstrate on patient hand placement.

## 2024-03-20 ENCOUNTER — EVALUATION (OUTPATIENT)
Dept: PHYSICAL THERAPY | Facility: CLINIC | Age: 64
End: 2024-03-20
Payer: COMMERCIAL

## 2024-03-20 DIAGNOSIS — M75.100 ROTATOR CUFF TEAR: Primary | ICD-10-CM

## 2024-03-20 PROCEDURE — 97110 THERAPEUTIC EXERCISES: CPT | Mod: GP | Performed by: PHYSICAL MEDICINE & REHABILITATION

## 2024-03-20 PROCEDURE — 97162 PT EVAL MOD COMPLEX 30 MIN: CPT | Mod: GP | Performed by: PHYSICAL MEDICINE & REHABILITATION

## 2024-03-20 ASSESSMENT — PAIN - FUNCTIONAL ASSESSMENT: PAIN_FUNCTIONAL_ASSESSMENT: 0-10

## 2024-03-20 ASSESSMENT — PAIN SCALES - GENERAL: PAINLEVEL_OUTOF10: 0 - NO PAIN

## 2024-03-20 NOTE — PROGRESS NOTES
Physical Therapy  Physical Therapy Orthopedic Evaluation    Patient Name: Melany Garcia  MRN: [id number]  Today's Date: 3/20/2024  Time Calculation  Start Time: 1535  Stop Time: 1615  Time Calculation (min): 40 min  PT Evaluation Time Entry  PT Evaluation (Moderate) Time Entry: 25  PT Therapeutic Procedures Time Entry  Therapeutic Exercise Time Entry: 15    Insurance:  Number of Treatments Authorized: 1 of 30        Insurance Type:  Employee Plan    Current Problem  1. Rotator cuff tear  Follow Up In Physical Therapy    Follow Up In Physical Therapy          General:  General  Reason for Referral: s/p R RCR (3/11/24)  Referred By: Romulo Guillen DO  Past Medical History Relevant to Rehab: Hx of diabetes, HTN, RA, Liver disease, Anemia  General Comment: Patient is s/p R RCR following a complete tear. DOS: 3/11/24. Patient reports that pain levels have been well managed since her surgery. She displays proper sling precautions upon arrival for today's visit.      Precautions:   Precautions  Precautions Comment: PROM only for 4 weeks; AAROM 4-6 weeks; AROM 6 weeks    Medical History Form: Reviewed (scanned into chart)    Subjective:   Subjective     Pain:  Pain Assessment: 0-10  Pain Score: 0 - No pain    Relevant Information (PMH & Previous Tests/Imaging): None  Previous Interventions/Treatments: None    Prior Level of Function (PLOF)  Patient previously independent with all ADLs  Work/School: Phlebotomist     Patients Living Environment: Reviewed and no concern    Primary Language: English    Patient's Goal(s) for Therapy: Return to PLOF    Red Flags: Do you have any of the following? No  Fever/chills, unexplained weight changes, dizziness/fainting, unexplained change in bowel or bladder functions, unexplained malaise or muscle weakness, night pain/sweats, numbness or tingling    Objective:  Objective     Shoulder  Observation  Shoulder Observation Comment: Incisions appear clean, dry and intact. Bruising  observed over R shoulder  Shoulder PROM  R shoulder flexion: (180°): 86  R shoulder abduction: (180°): 74  R shoulder ER: (90°): 64  Shoulder Strength  R shoulder flexion: (5/5): NT  R shoulder abduction: (5/5): NT  R shoulder ER: (5/5): NT  R shoulder IR: (5/5) : NT    Outcome Measures:  Other Measures  Disability of Arm Shoulder Hand (DASH): 84.1     Treatment Performed:  Therapeutic Exercise  Therapeutic Exercise Activity 1: PROM: Flex, Abd and ER  Therapeutic Exercise Activity 2: Shoulder Pendulums: flex/ext; horizontal, CW and CCW x20 each  Therapeutic Exercise Activity 3: Elbow Flex/Ext x20  Therapeutic Exercise Activity 4: Wrist Flexion: x20  Therapeutic Exercise Activity 5: Wrist Ext: x20  Therapeutic Exercise Activity 6: Stress Ball Squeeze x20    Education: Home exercise program, plan of care, activity modifications, pain management, and injury pathology  Outpatient Education  Education Comment: Access Code: [email or web address]  URL: [email or web address]  Date: 03/20/2024  Prepared by: aPvan Maki    Exercises  - Flexion-Extension Shoulder Pendulum with Table Support  - 2-3 x daily - 7 x weekly - 1 sets - 20 reps  - Horizontal Shoulder Pendulum with Table Support  - 2-3 x daily - 7 x weekly - 1 sets - 20 reps  - Circular Shoulder Pendulum with Table Support  - 2-3 x daily - 7 x weekly - 2 sets - 20 reps  - Seated Elbow Flexion Extension AROM  - 2-3 x daily - 7 x weekly - 1 sets - 20 reps  - Wrist Flexion AROM  - 2-3 x daily - 7 x weekly - 1 sets - 20 reps  - Wrist Extension AROM  - 2-3 x daily - 7 x weekly - 1 sets - 20 reps  - Forearm Strengthening with Ball Squeeze  - 2-3 x daily - 7 x weekly - 1 sets - 20 reps    Assessment: Patient presents with signs and symptoms consistent with s/p RCR, resulting in limited participation in pain-free ADLs and inability to perform at their prior level of function. Pt would benefit from physical therapy to address the impairments found & listed previously  in the objective section in order to return to safe and pain-free ADLs and prior level of function.       Plan:  PT Plan: Skilled PT  PT Frequency: 2 times per week  Duration: 10 weeks  Onset Date: 03/11/24  Number of Treatments Authorized: 1 of 30  Rehab Potential: Good  Plan of Care Agreement: Patient  Planned Interventions include: therapeutic exercise, self-care home management, manual therapy, therapeutic activities, gait training, neuromuscular coordination, vasopneumatic, dry needling, aquatic therapy    Goals: Set and discussed today  Active       PT Problem       Patient to score 10% or less on QuickDASH in order to show improvement in overall quality of life.       Start:  03/20/24    Expected End:  05/29/24            Patient to achieve 160 degrees of right shoulder flexion and abduction AROM in order to be able to reach overhead.       Start:  03/20/24    Expected End:  05/29/24            Patient to achieve 4+/5 strength in right shoulder globally in order to perform daily tasks without difficulty.       Start:  03/20/24    Expected End:  05/29/24            Patient to demonstrate independence with HEP in order to establish self management of post-op recovery.        Start:  03/20/24    Expected End:  05/29/24                Plan of care was developed with input and agreement by the patient      Pavan Maki, PT

## 2024-03-22 ENCOUNTER — TREATMENT (OUTPATIENT)
Dept: PHYSICAL THERAPY | Facility: CLINIC | Age: 64
End: 2024-03-22
Payer: COMMERCIAL

## 2024-03-22 DIAGNOSIS — M75.100 ROTATOR CUFF TEAR: Primary | ICD-10-CM

## 2024-03-22 PROCEDURE — 97140 MANUAL THERAPY 1/> REGIONS: CPT | Mod: GP,CQ

## 2024-03-22 ASSESSMENT — PAIN - FUNCTIONAL ASSESSMENT: PAIN_FUNCTIONAL_ASSESSMENT: 0-10

## 2024-03-22 ASSESSMENT — PAIN SCALES - GENERAL: PAINLEVEL_OUTOF10: 0 - NO PAIN

## 2024-03-22 NOTE — PROGRESS NOTES
Physical Therapy Treatment    Patient Name: Melany Garcia  MRN: [id number]  Today's Date: 3/22/2024  Time Calculation  Start Time: 1444  Stop Time: 1516  Time Calculation (min): 32 min  PT Therapeutic Procedures Time Entry  Manual Therapy Time Entry: 28  Therapeutic Exercise Time Entry: 4,      Current Problem  1. Rotator cuff tear  Follow Up In Physical Therapy            Insurance:  Payor:  EMPLOYEE MEDICAL PLAN / Plan:  EMPLOYEE MEDICAL PLAN HEALTHY CHOICE / Product Type: *No Product type* /   Number of Treatments Authorized: 2 of 30          Subjective   General  Reason for Referral: s/p R RCR (3/11/24)  Referred By: Romulo Guillen DO  Past Medical History Relevant to Rehab: Hx of diabetes, HTN, RA, Liver disease, Anemia (REVIEWED MEDICAL HISTORY)  General Comment: PT STATES HER SHOULDER IS DOING OK TODAY.  DIDN'T GET TO HER HEP YESTERDAY.    Performing HEP?: Yes    Precautions  Precautions  Precautions Comment: PROM only for 4 weeks; AAROM 4-6 weeks; AROM 6 weeks  Pain  Pain Assessment: 0-10  Pain Score: 0 - No pain  Pain Location: Shoulder  Pain Orientation: Right    Objective   General Observation  General Observation: BRUISING FROM R SHOULDER TO FOREARM (R SH FLEX ~ 100*)       Treatments:    Therapeutic Exercise  Therapeutic Exercise Activity 1: RETRO SH ROLLS X 2 MIN  Therapeutic Exercise Activity 2: SCAP RETRACTION X 2 MIN         Manual Therapy  Manual Therapy Activity 1: STM R PEC, BICEP, INFRA, TERES, UT  Manual Therapy Activity 2: PROM R SH FLEX, ER, IR, ABD  Manual Therapy Activity 3: R SH OSCILLATIONS                   OP EDUCATION:  Outpatient Education  Education Comment: Access Code: [email or web address]  URL: [email or web address]  Date: 03/22/2024  Prepared by: Geo Morris    Exercises  - Seated Scapular Retraction  - 3 x daily - 7 x weekly - 1 sets - 20 reps - 2 sec hold  - Seated Shoulder Rolls  - 3 x daily - 7 x weekly - 1 sets - 20 reps    Assessment:  PT  Assessment  Assessment Comment: PT MIGUELITO SESSION WELL.  HER ROM IS PROGRESSING WELL AT THIS POINT.    Plan:  OP PT Plan  PT Plan: Skilled PT  PT Frequency: 2 times per week  Duration: 10 weeks  Onset Date: 03/11/24  Number of Treatments Authorized: 2 of 30  Rehab Potential: Good  Plan of Care Agreement: Patient    Goals:  Active       PT Problem       Patient to score 10% or less on QuickDASH in order to show improvement in overall quality of life.       Start:  03/20/24    Expected End:  05/29/24            Patient to achieve 160 degrees of right shoulder flexion and abduction AROM in order to be able to reach overhead.       Start:  03/20/24    Expected End:  05/29/24            Patient to achieve 4+/5 strength in right shoulder globally in order to perform daily tasks without difficulty.       Start:  03/20/24    Expected End:  05/29/24            Patient to demonstrate independence with HEP in order to establish self management of post-op recovery.        Start:  03/20/24    Expected End:  05/29/24                 Burke Morris, PTA

## 2024-03-26 ENCOUNTER — TREATMENT (OUTPATIENT)
Dept: PHYSICAL THERAPY | Facility: CLINIC | Age: 64
End: 2024-03-26
Payer: COMMERCIAL

## 2024-03-26 DIAGNOSIS — M75.100 ROTATOR CUFF TEAR: ICD-10-CM

## 2024-03-26 PROCEDURE — 97140 MANUAL THERAPY 1/> REGIONS: CPT | Mod: GP,CQ

## 2024-03-26 ASSESSMENT — PAIN - FUNCTIONAL ASSESSMENT: PAIN_FUNCTIONAL_ASSESSMENT: 0-10

## 2024-03-26 ASSESSMENT — PAIN SCALES - GENERAL: PAINLEVEL_OUTOF10: 1

## 2024-03-26 NOTE — PROGRESS NOTES
Physical Therapy Treatment    Patient Name: Melany Garcia  MRN: [id number]  Today's Date: 3/26/2024  Time Calculation  Start Time: 1256  Stop Time: 1341  Time Calculation (min): 45 min   ,      Current Problem  1. Rotator cuff tear  Follow Up In Physical Therapy          Insurance:  Number of Treatments Authorized: 3 of 30            Subjective   General  Reason for Referral: s/p R RCR (3/11/24)  Referred By: Romulo Guillen DO  Past Medical History Relevant to Rehab: Hx of diabetes, HTN, RA, Liver disease, Anemia (REVIEWED MEDICAL HISTORY)  General Comment: Pt reports increased soreness during HEP that does not last after movement. Also reports feeling like she is progressing with therapy.    Performing HEP?: Yes, reports soreness    Precautions  Precautions  Precautions Comment: PROM only for 4 weeks; AAROM 4-6 weeks; AROM 6 weeks  Pain  Pain Assessment: 0-10  Pain Score: 1  Pain Location: Shoulder  Pain Orientation: Right    Objective     LESS BRUISING FROM R SHOULDER TO FOREARM     Treatments:    Therapeutic Exercise  Therapeutic Exercise Activity 1: RETRO SH ROLLS X 2 MIN  Therapeutic Exercise Activity 2: SCAP RETRACTION X 2 MIN  Therapeutic Exercise Activity 3: Elbow Flex/Ext x 2 MIN         Manual Therapy  Manual Therapy Activity 1: STM R PEC, BICEP, INFRA, TERES, UT  Manual Therapy Activity 2: PROM R SH FLEX, ER, IR, ABD  Manual Therapy Activity 3: R SH OSCILLATIONS                 Participated in performance of tx and documentation under the direct supervision of CI, student Camila ACOSTA       OP EDUCATION:  Outpatient Education  Education Comment: REEDUCATION OF SLING USAGE, CONTINUE WITH CURRENT HEP    Assessment:  PT Assessment  Assessment Comment: Pt with increased tolerance to PROM, progressing well towards goals.    Plan:  OP PT Plan  PT Plan: Skilled PT  PT Frequency: 2 times per week  Duration: 10 weeks  Onset Date: 03/11/24  Number of Treatments Authorized: 3 of 30  Rehab Potential:  Good  Plan of Care Agreement: Patient    Goals:  Active       PT Problem       Patient to score 10% or less on QuickDASH in order to show improvement in overall quality of life.       Start:  03/20/24    Expected End:  05/29/24            Patient to achieve 160 degrees of right shoulder flexion and abduction AROM in order to be able to reach overhead.       Start:  03/20/24    Expected End:  05/29/24            Patient to achieve 4+/5 strength in right shoulder globally in order to perform daily tasks without difficulty.       Start:  03/20/24    Expected End:  05/29/24            Patient to demonstrate independence with HEP in order to establish self management of post-op recovery.        Start:  03/20/24    Expected End:  05/29/24                 ALICE [email or web address]

## 2024-04-01 ENCOUNTER — TREATMENT (OUTPATIENT)
Dept: PHYSICAL THERAPY | Facility: CLINIC | Age: 64
End: 2024-04-01
Payer: COMMERCIAL

## 2024-04-01 DIAGNOSIS — M75.100 ROTATOR CUFF TEAR: ICD-10-CM

## 2024-04-01 PROCEDURE — 97110 THERAPEUTIC EXERCISES: CPT | Mod: GP,CQ

## 2024-04-01 PROCEDURE — 97140 MANUAL THERAPY 1/> REGIONS: CPT | Mod: GP,CQ

## 2024-04-01 ASSESSMENT — PAIN - FUNCTIONAL ASSESSMENT: PAIN_FUNCTIONAL_ASSESSMENT: 0-10

## 2024-04-01 ASSESSMENT — PAIN SCALES - GENERAL: PAINLEVEL_OUTOF10: 0 - NO PAIN

## 2024-04-01 NOTE — PROGRESS NOTES
"  Physical Therapy Treatment    Patient Name: Melany Garcia  MRN: [id number]  Today's Date: 4/1/2024  Time Calculation  Start Time: 1031  Stop Time: 1109  Time Calculation (min): 38 min  PT Therapeutic Procedures Time Entry  Manual Therapy Time Entry: 28  Therapeutic Exercise Time Entry: 10,      Current Problem  1. Rotator cuff tear  Follow Up In Physical Therapy            Insurance:  Payor:  EMPLOYEE MEDICAL PLAN / Plan:  EMPLOYEE MEDICAL PLAN HEALTHY CHOICE / Product Type: *No Product type* /   Number of Treatments Authorized: 4 of 30          Subjective   General  Reason for Referral: s/p R RCR (3/11/24)  Referred By: Romulo Guillen DO  Past Medical History Relevant to Rehab: Hx of diabetes, HTN, RA, Liver disease, Anemia (REVIEWED MEDICAL HISTORY)  General Comment: PT STATES HER R SH IS DOING BETTER.  STILL CAN'T SLEEP WELL, BUT THAT'S BECAUSE OF POSITIONING AND NOT PAIN.    Performing HEP?: Yes    Precautions  Precautions  Precautions Comment: PROM only for 4 weeks; AAROM 4-6 weeks; AROM 6 weeks  Pain  Pain Assessment: 0-10  Pain Score: 0 - No pain  Pain Location: Shoulder  Pain Orientation: Right    Objective   General Observation  General Observation: LESS BRUISING FROM R SHOULDER TO FOREARM       Treatments:  Therapeutic Exercise  Therapeutic Exercise Activity 1: OTD PULLEYS SH FLEX X 4 MIN  Therapeutic Exercise Activity 2: PENDULUM X 2 MIN  Therapeutic Exercise Activity 3: SH EXT ISO HOLD 5\" X 2 MIN  Therapeutic Exercise Activity 4: SCAP RETRACTION X 2 MIN         Manual Therapy  Manual Therapy Activity 1: STM R PEC, BICEP, INFRA, TERES, UT  Manual Therapy Activity 2: PROM R SH FLEX, ER, IR, ABD  Manual Therapy Activity 3: R SH OSCILLATIONS                   OP EDUCATION:  Outpatient Education  Education Comment: Access Code: [email or web address]  URL: [email or web address]  Date: 04/01/2024  Prepared by: Geo Morris    Exercises  - Standing Isometric Shoulder Extension with Doorway " - Arm Bent  - 3 x daily - 7 x weekly - 1 sets - 20 reps - 5 sec hold    Assessment:  PT Assessment  Assessment Comment: PT MIGUELITO EX'S WELL.  HER ROM CONTINUES TO PROGRESS.  STILL HAS NOTED TIGHTNESS IN HER R PEC, BICEP, TERES.  PT IS PROGRESSING TOWARDS GOALS.    Plan:  OP PT Plan  PT Plan: Skilled PT  PT Frequency: 2 times per week  Duration: 10 weeks  Onset Date: 03/11/24  Number of Treatments Authorized: 4 of 30  Rehab Potential: Good  Plan of Care Agreement: Patient    Goals:  Active       PT Problem       Patient to score 10% or less on QuickDASH in order to show improvement in overall quality of life.       Start:  03/20/24    Expected End:  05/29/24            Patient to achieve 160 degrees of right shoulder flexion and abduction AROM in order to be able to reach overhead.       Start:  03/20/24    Expected End:  05/29/24            Patient to achieve 4+/5 strength in right shoulder globally in order to perform daily tasks without difficulty.       Start:  03/20/24    Expected End:  05/29/24            Patient to demonstrate independence with HEP in order to establish self management of post-op recovery.        Start:  03/20/24    Expected End:  05/29/24                 Burke Morris, PTA

## 2024-04-03 ENCOUNTER — TREATMENT (OUTPATIENT)
Dept: PHYSICAL THERAPY | Facility: CLINIC | Age: 64
End: 2024-04-03
Payer: COMMERCIAL

## 2024-04-03 DIAGNOSIS — M75.100 ROTATOR CUFF TEAR: ICD-10-CM

## 2024-04-03 PROCEDURE — 97110 THERAPEUTIC EXERCISES: CPT | Mod: GP,CQ

## 2024-04-03 PROCEDURE — 97140 MANUAL THERAPY 1/> REGIONS: CPT | Mod: GP,CQ

## 2024-04-03 ASSESSMENT — PAIN SCALES - GENERAL: PAINLEVEL_OUTOF10: 0 - NO PAIN

## 2024-04-03 ASSESSMENT — PAIN - FUNCTIONAL ASSESSMENT: PAIN_FUNCTIONAL_ASSESSMENT: 0-10

## 2024-04-03 NOTE — PROGRESS NOTES
Physical Therapy Treatment    Patient Name: Melany Garcia  MRN: [id number]  Today's Date: 4/3/2024  Time Calculation  Start Time: 1231  Stop Time: 1311  Time Calculation (min): 40 min  PT Therapeutic Procedures Time Entry  Manual Therapy Time Entry: 32  Therapeutic Exercise Time Entry: 8,      Current Problem  1. Rotator cuff tear  Follow Up In Physical Therapy            Insurance:  Payor:  EMPLOYEE MEDICAL PLAN / Plan:  EMPLOYEE MEDICAL PLAN HEALTHY CHOICE / Product Type: *No Product type* /   Number of Treatments Authorized: 5 of 30          Subjective   General  Reason for Referral: s/p R RCR (3/11/24)  Referred By: Romulo Guillen DO  Past Medical History Relevant to Rehab: Hx of diabetes, HTN, RA, Liver disease, Anemia (REVIEWED MEDICAL HISTORY)  General Comment: PT STATES SHE WAS A LITTLE SORE AFTER LAST VISIT BUT NOT BAD.  DOING OK TODAY.    Performing HEP?: Yes    Precautions  Precautions  Precautions Comment: PROM only for 4 weeks; AAROM 4-6 weeks; AROM 6 weeks  Pain  Pain Assessment: 0-10  Pain Score: 0 - No pain  Pain Location: Shoulder  Pain Orientation: Right    Objective   General Observation  General Observation: R SH FLEX PROM ~ 120*       Treatments:  Therapeutic Exercise  Therapeutic Exercise Activity 1: OTD PULLEYS SH FLEX X 6 MIN  Therapeutic Exercise Activity 2: PENDULUM X 2 MIN         Manual Therapy  Manual Therapy Activity 1: STM R PEC, BICEP, INFRA, TERES, UT  Manual Therapy Activity 2: PROM R SH FLEX, ER, IR, ABD  Manual Therapy Activity 3: R SH OSCILLATIONS  Manual Therapy Activity 4: R GH JT POS, INF MOBS GRADE 1,2                   OP EDUCATION:  Outpatient Education  Education Comment: CONTINUE WITH CURRENT HEP    Assessment:  PT Assessment  Assessment Comment: PT CONTINUES TO PROGRESS WITH HER PROM.  SHE IS TIGHT IN HER R BICEP, PEC, UT, TERES.    Plan:  OP PT Plan  PT Plan: Skilled PT (CAN START AAROM NEXT VISIT.)  PT Frequency: 2 times per week  Duration: 10 weeks  Onset  Date: 03/11/24  Number of Treatments Authorized: 5 of 30  Rehab Potential: Good  Plan of Care Agreement: Patient    Goals:  Active       PT Problem       Patient to score 10% or less on QuickDASH in order to show improvement in overall quality of life.       Start:  03/20/24    Expected End:  05/29/24            Patient to achieve 160 degrees of right shoulder flexion and abduction AROM in order to be able to reach overhead.       Start:  03/20/24    Expected End:  05/29/24            Patient to achieve 4+/5 strength in right shoulder globally in order to perform daily tasks without difficulty.       Start:  03/20/24    Expected End:  05/29/24            Patient to demonstrate independence with HEP in order to establish self management of post-op recovery.        Start:  03/20/24    Expected End:  05/29/24                 Burke Morris, PTA

## 2024-04-08 ENCOUNTER — APPOINTMENT (OUTPATIENT)
Dept: PHYSICAL THERAPY | Facility: CLINIC | Age: 64
End: 2024-04-08
Payer: COMMERCIAL

## 2024-04-10 ENCOUNTER — TREATMENT (OUTPATIENT)
Dept: PHYSICAL THERAPY | Facility: CLINIC | Age: 64
End: 2024-04-10
Payer: COMMERCIAL

## 2024-04-10 DIAGNOSIS — M75.100 ROTATOR CUFF TEAR: ICD-10-CM

## 2024-04-10 PROCEDURE — 97110 THERAPEUTIC EXERCISES: CPT | Mod: GP | Performed by: PHYSICAL MEDICINE & REHABILITATION

## 2024-04-10 ASSESSMENT — PAIN - FUNCTIONAL ASSESSMENT: PAIN_FUNCTIONAL_ASSESSMENT: 0-10

## 2024-04-10 ASSESSMENT — PAIN SCALES - GENERAL: PAINLEVEL_OUTOF10: 0 - NO PAIN

## 2024-04-10 NOTE — PROGRESS NOTES
"  Physical Therapy Treatment    Patient Name: Melany Garcia  MRN: [id number]  Today's Date: 4/10/2024  Time Calculation  Start Time: 1320  Stop Time: 1400  Time Calculation (min): 40 min  PT Therapeutic Procedures Time Entry  Manual Therapy Time Entry: 5  Therapeutic Exercise Time Entry: 35,      Current Problem  1. Rotator cuff tear  Follow Up In Physical Therapy          Insurance:  Number of Treatments Authorized: 6 of 30          Subjective   General  Reason for Referral: s/p R RCR (3/11/24)  Referred By: Romulo Guillen DO  Past Medical History Relevant to Rehab: Hx of diabetes, HTN, RA, Liver disease, Anemia  General Comment: Patient reports minimal issues regarding her right shoulder this visit. She notes that her greatest difficulty is sleeping on her side. No new complaints otherwise.    Performing HEP?: Yes    Precautions  Precautions  Precautions Comment: PROM only for 4 weeks; AAROM 4-6 weeks; AROM 6 weeks  Pain  Pain Assessment: 0-10  Pain Score: 0 - No pain  Pain Location: Shoulder  Pain Orientation: Right    Objective   Shoulder  Shoulder PROM  R shoulder flexion: (180°): 126  R shoulder abduction: (180°): 110  R shoulder ER: (90°): 67    Treatments:    Therapeutic Exercise  Therapeutic Exercise Activity 1: Pulleys: Flexion and Scaption x2' each  Therapeutic Exercise Activity 2: R Shoulder PROM: Flex, Abd, ER  Therapeutic Exercise Activity 3: Supine Shoulder Flexion AAROM: x20 w dowel  Therapeutic Exercise Activity 4: Supine Shoulder ER AAROM: x20 w dowel  Therapeutic Exercise Activity 5: Shoulder IR Isometrics: 10x10\" holds  Therapeutic Exercise Activity 6: Shoulder ER Isometrics: 10x10\" holds    Manual Therapy  Manual Therapy Activity 1: GH AP and inferior joint mobilizations: Grade II/III    OP EDUCATION:  Outpatient Education  Education Comment: Access Code: [email or web address]  URL: [email or web address]  Date: 04/10/2024  Prepared by: Pavan Maki    Exercises  - Supine Shoulder " Flexion Extension AAROM with Dowel  - 2-3 x daily - 7 x weekly - 2 sets - 10 reps  - Supine Shoulder External Rotation with Dowel  - 2-3 x daily - 7 x weekly - 2 sets - 10 reps  - Standing Shoulder Abduction AAROM with Dowel  - 2-3 x daily - 7 x weekly - 2 sets - 10 reps  - Standing Isometric Shoulder Internal Rotation at Doorway  - 1-2 x daily - 7 x weekly - 1 sets - 10 reps - 10 second hold  - Standing Isometric Shoulder External Rotation with Doorway  - 1-2 x daily - 7 x weekly - 1 sets - 10 reps - 10 second hold    Assessment:  PT Assessment  Assessment Comment: Patient is progressing well with improved R shoulder PROM and pain levels remaining minimal to none. She was advanced to AAROM activities this visit to continue improving ROM. No increased pain reported at the conclusion of the session. Overall response toward today's interventions was great.    Plan:  OP PT Plan  PT Plan: Skilled PT (CAN START AAROM NEXT VISIT.)  PT Frequency: 2 times per week  Duration: 10 weeks  Onset Date: 03/11/24  Number of Treatments Authorized: 6 of 30  Rehab Potential: Good  Plan of Care Agreement: Patient    Goals:  Active       PT Problem       Patient to score 10% or less on QuickDASH in order to show improvement in overall quality of life.       Start:  03/20/24    Expected End:  05/29/24            Patient to achieve 160 degrees of right shoulder flexion and abduction AROM in order to be able to reach overhead.       Start:  03/20/24    Expected End:  05/29/24            Patient to achieve 4+/5 strength in right shoulder globally in order to perform daily tasks without difficulty.       Start:  03/20/24    Expected End:  05/29/24            Patient to demonstrate independence with HEP in order to establish self management of post-op recovery.        Start:  03/20/24    Expected End:  05/29/24                 Pavan Maki, PT

## 2024-04-12 ENCOUNTER — TREATMENT (OUTPATIENT)
Dept: PHYSICAL THERAPY | Facility: CLINIC | Age: 64
End: 2024-04-12
Payer: COMMERCIAL

## 2024-04-12 DIAGNOSIS — M75.100 ROTATOR CUFF TEAR: ICD-10-CM

## 2024-04-12 PROCEDURE — 97110 THERAPEUTIC EXERCISES: CPT | Mod: GP,CQ

## 2024-04-12 PROCEDURE — 97140 MANUAL THERAPY 1/> REGIONS: CPT | Mod: GP,CQ

## 2024-04-12 ASSESSMENT — PAIN - FUNCTIONAL ASSESSMENT: PAIN_FUNCTIONAL_ASSESSMENT: 0-10

## 2024-04-12 ASSESSMENT — PAIN SCALES - GENERAL: PAINLEVEL_OUTOF10: 0 - NO PAIN

## 2024-04-12 NOTE — PROGRESS NOTES
"  Physical Therapy Treatment    Patient Name: Melany Garcia  MRN: [id number]  Today's Date: 4/12/2024  Time Calculation  Start Time: 0819  Stop Time: 0900  Time Calculation (min): 41 min  PT Therapeutic Procedures Time Entry  Manual Therapy Time Entry: 8  Therapeutic Exercise Time Entry: 33,      Current Problem  1. Rotator cuff tear  Follow Up In Physical Therapy            Insurance:  Payor:  EMPLOYEE MEDICAL PLAN / Plan:  EMPLOYEE MEDICAL PLAN HEALTHY CHOICE / Product Type: *No Product type* /   Number of Treatments Authorized: 7 of 30          Subjective   General  Reason for Referral: s/p R RCR (3/11/24)  Referred By: Romulo Guillen DO  Past Medical History Relevant to Rehab: Hx of diabetes, HTN, RA, Liver disease, Anemia  General Comment: PT STATES HER SHOULDER IS DOING WELL. NEW HEP IS GOING FINE.  SHE DOES STILL HAVE SOME TINGLING IN HER R FOREARM AND DECREASED STRENGTH IN HER THUMB ONCE IN A WHILE.    Performing HEP?: Yes    Precautions  Precautions  Precautions Comment: PROM only for 4 weeks; AAROM 4-6 weeks; AROM 6 weeks  Pain  Pain Assessment: 0-10  Pain Score: 0 - No pain  Pain Location: Shoulder  Pain Orientation: Right    Objective   General Observation  General Observation: FWD POSTURE       Treatments:    Therapeutic Exercise  Therapeutic Exercise Activity 1: SCIFIT UE F/B MAN L1 X 6 MIN  Therapeutic Exercise Activity 2: PEC STRETCH LOW X 1 MIN  Therapeutic Exercise Activity 3: ER - IR WALKOUTS EXTRA LIGHT BAND HOLD 5\" X 2 MIN EACH  Therapeutic Exercise Activity 4: OTD PULLEY SH FLEX, ABD, IR X 6 MIN  Therapeutic Exercise Activity 5: SUPINE SH FLEX WITH PVC X 2 MIN  Therapeutic Exercise Activity 6: STANDING AAROM WITH PVC SH IR / EXT X 2 MIN  Therapeutic Exercise Activity 7: PENDULUM X 2 MIN         Manual Therapy  Manual Therapy Activity 1: STM R PEC, BICEP  Manual Therapy Activity 2: PROM R SH FLEX, IR  Manual Therapy Activity 3: R SH OSCILLATIONS  Manual Therapy Activity 4: R ARM PULL " GENTLE                   OP EDUCATION:  Outpatient Education  Education Comment: CONTINUE WITH CURRENT HEP    Assessment:  PT Assessment  Assessment Comment: PT MIGUELITO EX'S WELL.  SHE HAD DIFFICULTY WITH IR ROM UP HER BACK WITH PULLEYS.  PT CONTINUES TO PROGRESS WITH HER OVERALL ROM AND AAROM.    Plan:  OP PT Plan  PT Plan: Skilled PT (CAN START AAROM NEXT VISIT.)  PT Frequency: 2 times per week  Duration: 10 weeks  Onset Date: 03/11/24  Number of Treatments Authorized: 7 of 30  Rehab Potential: Good  Plan of Care Agreement: Patient    Goals:  Active       PT Problem       Patient to score 10% or less on QuickDASH in order to show improvement in overall quality of life.       Start:  03/20/24    Expected End:  05/29/24            Patient to achieve 160 degrees of right shoulder flexion and abduction AROM in order to be able to reach overhead.       Start:  03/20/24    Expected End:  05/29/24            Patient to achieve 4+/5 strength in right shoulder globally in order to perform daily tasks without difficulty.       Start:  03/20/24    Expected End:  05/29/24            Patient to demonstrate independence with HEP in order to establish self management of post-op recovery.        Start:  03/20/24    Expected End:  05/29/24                 Burke Morris, PTA

## 2024-04-15 ENCOUNTER — APPOINTMENT (OUTPATIENT)
Dept: PHYSICAL THERAPY | Facility: CLINIC | Age: 64
End: 2024-04-15
Payer: COMMERCIAL

## 2024-04-17 ENCOUNTER — TREATMENT (OUTPATIENT)
Dept: PHYSICAL THERAPY | Facility: CLINIC | Age: 64
End: 2024-04-17
Payer: COMMERCIAL

## 2024-04-17 DIAGNOSIS — M75.100 ROTATOR CUFF TEAR: ICD-10-CM

## 2024-04-17 PROCEDURE — 97110 THERAPEUTIC EXERCISES: CPT | Mod: GP | Performed by: PHYSICAL MEDICINE & REHABILITATION

## 2024-04-17 ASSESSMENT — PAIN - FUNCTIONAL ASSESSMENT: PAIN_FUNCTIONAL_ASSESSMENT: 0-10

## 2024-04-17 ASSESSMENT — PAIN SCALES - GENERAL: PAINLEVEL_OUTOF10: 0 - NO PAIN

## 2024-04-17 NOTE — PROGRESS NOTES
"  Physical Therapy Treatment    Patient Name: Melany Garcia  MRN: [id number]  Today's Date: 4/17/2024  Time Calculation  Start Time: 1233  Stop Time: 1313  Time Calculation (min): 40 min  PT Therapeutic Procedures Time Entry  Therapeutic Exercise Time Entry: 40,      Current Problem  1. Rotator cuff tear  Follow Up In Physical Therapy          Insurance:  Number of Treatments Authorized: 8 of 30          Subjective   General  Reason for Referral: s/p R RCR (3/11/24)  Referred By: Romulo Guillen DO  Past Medical History Relevant to Rehab: Hx of diabetes, HTN, RA, Liver disease, Anemia  General Comment: Patient reports no new issues regarding her right shoulder this visit. She recently followed up with her referring surgeon, who is pleased with her progress thus far.    Performing HEP?: Yes    Precautions  Precautions  Precautions Comment: PROM only for 4 weeks; AAROM 4-6 weeks; AROM 6 weeks  Pain  Pain Assessment: 0-10  Pain Score: 0 - No pain  Pain Location: Shoulder  Pain Orientation: Right    Objective   Shoulder  Shoulder AROM  R Shoulder flexion: (180°): 115  R shoulder abduction: (180°): 80    Treatments:    Therapeutic Exercise  Therapeutic Exercise Activity 1: SciFit: L2: fwd/back x3' each  Therapeutic Exercise Activity 2: Supine Shoulder Flexion AAROM: x20 w dowel  Therapeutic Exercise Activity 3: Supine Shoulder ER AAROM: x20 w dowel  Therapeutic Exercise Activity 4: Standing Shoulder Abduction AAROM: x20 w dowel  Therapeutic Exercise Activity 5: Shoulder IR Isometrics: 10x10\" holds  Therapeutic Exercise Activity 6: Shoulder ER Isometrics: 10x10\" holds  Therapeutic Exercise Activity 7: Shoulder IR reactive Isometrics: 10x2 steps out and in w RTB  Therapeutic Exercise Activity 8: Shoulder ER reactive Isometrics: 10x2 steps out and in w RTB  Therapeutic Exercise Activity 9: Table Slides: Flexion and abduction x20 each  Therapeutic Exercise Activity 10: Pulleys: Flexion and Scaption x2' each    OP " EDUCATION:  Outpatient Education  Education Comment: Access Code: [email or web address]  URL: [email or web address]  Date: 04/17/2024  Prepared by: Pavan Maki    Exercises  - Shoulder External Rotation Reactive Isometrics  - 1-2 x daily - 7 x weekly - 1 sets - 10 reps  - Shoulder Internal Rotation Reactive Isometrics  - 1-2 x daily - 7 x weekly - 1 sets - 10 reps    Assessment:  PT Assessment  Assessment Comment: Today's visit focused on progressing activities to improve patient's right shoulder AROM and strength. Patient demonstrated good effort toward today's progressions. No increased pain reported at the conclusion of the session. Overall response toward today's interventions was great.    Plan:  OP PT Plan  PT Plan: Skilled PT (CAN START AAROM NEXT VISIT.)  PT Frequency: 2 times per week  Duration: 10 weeks  Onset Date: 03/11/24  Number of Treatments Authorized: 8 of 30  Rehab Potential: Good  Plan of Care Agreement: Patient    Goals:  Active       PT Problem       Patient to score 10% or less on QuickDASH in order to show improvement in overall quality of life.       Start:  03/20/24    Expected End:  05/29/24            Patient to achieve 160 degrees of right shoulder flexion and abduction AROM in order to be able to reach overhead.       Start:  03/20/24    Expected End:  05/29/24            Patient to achieve 4+/5 strength in right shoulder globally in order to perform daily tasks without difficulty.       Start:  03/20/24    Expected End:  05/29/24            Patient to demonstrate independence with HEP in order to establish self management of post-op recovery.        Start:  03/20/24    Expected End:  05/29/24                 Pavan Maki, PT

## 2024-04-19 ENCOUNTER — TREATMENT (OUTPATIENT)
Dept: PHYSICAL THERAPY | Facility: CLINIC | Age: 64
End: 2024-04-19
Payer: COMMERCIAL

## 2024-04-19 DIAGNOSIS — M75.100 ROTATOR CUFF TEAR: ICD-10-CM

## 2024-04-19 PROCEDURE — 97110 THERAPEUTIC EXERCISES: CPT | Mod: GP,CQ

## 2024-04-19 ASSESSMENT — PAIN - FUNCTIONAL ASSESSMENT: PAIN_FUNCTIONAL_ASSESSMENT: 0-10

## 2024-04-19 ASSESSMENT — PAIN SCALES - GENERAL: PAINLEVEL_OUTOF10: 0 - NO PAIN

## 2024-04-19 NOTE — PROGRESS NOTES
Physical Therapy Treatment    Patient Name: Melany Garcia  MRN: [id number]  Today's Date: 4/19/2024  Time Calculation  Start Time: 1118  Stop Time: 1157  Time Calculation (min): 39 min  PT Therapeutic Procedures Time Entry  Therapeutic Exercise Time Entry: 39,      Current Problem  1. Rotator cuff tear  Follow Up In Physical Therapy            Insurance:  Payor:  EMPLOYEE MEDICAL PLAN / Plan:  EMPLOYEE MEDICAL PLAN HEALTHY CHOICE / Product Type: *No Product type* /   Number of Treatments Authorized: 9 of 30          Subjective   General  Reason for Referral: s/p R RCR (3/11/24)  Referred By: Romulo Guillen DO  Past Medical History Relevant to Rehab: Hx of diabetes, HTN, RA, Liver disease, Anemia  General Comment: PT STATES HER R SHOULDER IS STILL FEELING GOOD.    Performing HEP?: Yes    Precautions  Precautions  Precautions Comment: PROM only for 4 weeks; AAROM 4-6 weeks; AROM 6 weeks  Pain  Pain Assessment: 0-10  Pain Score: 0 - No pain  Pain Location: Shoulder  Pain Orientation: Right    Objective   General Observation  General Observation: FWD POSTURE     Treatments:    Therapeutic Exercise  Therapeutic Exercise Activity 1: SciFit: L2: fwd/back x3' each  Therapeutic Exercise Activity 2: PEC STRETCH LOW X 1 MIN  Therapeutic Exercise Activity 3: LADDER FINGER WALKS X 3 MIN LEVEL 25  Therapeutic Exercise Activity 4: WALL SLIDE SH FLEX X 2 MIN  Therapeutic Exercise Activity 5: SUPINE AROM SH FLEX X 2 MIN  Therapeutic Exercise Activity 6: SL AROM ER X 2 MIN  Therapeutic Exercise Activity 7: BUTTERFLY X 2 MIN  Therapeutic Exercise Activity 8: SH EXT YELLOW TBAND X 1 MIN  Therapeutic Exercise Activity 9: ROWS YELLOW TBAND X 1 MIN  Therapeutic Exercise Activity 10: SPO R 0# X 1 MIN  Therapeutic Exercise Activity 11: PRONE SH EXT X 2 MIN  Therapeutic Exercise Activity 12: PRONE ROWS X 2 MIN  Therapeutic Exercise Activity 13: SUPINE AAROM BENCH PRESS X 2 MIN  Therapeutic Exercise Activity 14: SUPINE AROM H  ABD/ H ADD X 2 MIN         Manual Therapy  Manual Therapy Performed: No                   OP EDUCATION:  Outpatient Education  Education Comment: CONTINUE WITH CURRENT HEP    Assessment:  PT Assessment  Assessment Comment: PT MIGUELITO EX'S WELL.  NO C/O PAIN WITH THER EX, JUST FATIGUE.  ROM CONTINUES TO PROGRESS.    Plan:  OP PT Plan  PT Plan: Skilled PT (PROGRESS AROM HEP NEXT VISIT)  PT Frequency: 2 times per week  Duration: 10 weeks  Onset Date: 03/11/24  Number of Treatments Authorized: 9 of 30  Rehab Potential: Good  Plan of Care Agreement: Patient    Goals:  Active       PT Problem       Patient to score 10% or less on QuickDASH in order to show improvement in overall quality of life.       Start:  03/20/24    Expected End:  05/29/24            Patient to achieve 160 degrees of right shoulder flexion and abduction AROM in order to be able to reach overhead.       Start:  03/20/24    Expected End:  05/29/24            Patient to achieve 4+/5 strength in right shoulder globally in order to perform daily tasks without difficulty.       Start:  03/20/24    Expected End:  05/29/24            Patient to demonstrate independence with HEP in order to establish self management of post-op recovery.        Start:  03/20/24    Expected End:  05/29/24                 Burke Morris, PTA

## 2024-04-23 ENCOUNTER — PHARMACY VISIT (OUTPATIENT)
Dept: PHARMACY | Facility: CLINIC | Age: 64
End: 2024-04-23
Payer: COMMERCIAL

## 2024-04-23 ENCOUNTER — TREATMENT (OUTPATIENT)
Dept: PHYSICAL THERAPY | Facility: CLINIC | Age: 64
End: 2024-04-23
Payer: COMMERCIAL

## 2024-04-23 DIAGNOSIS — M75.100 ROTATOR CUFF TEAR: ICD-10-CM

## 2024-04-23 PROCEDURE — 97110 THERAPEUTIC EXERCISES: CPT | Mod: GP | Performed by: PHYSICAL MEDICINE & REHABILITATION

## 2024-04-23 PROCEDURE — RXMED WILLOW AMBULATORY MEDICATION CHARGE

## 2024-04-23 ASSESSMENT — PAIN SCALES - GENERAL: PAINLEVEL_OUTOF10: 0 - NO PAIN

## 2024-04-23 ASSESSMENT — PAIN - FUNCTIONAL ASSESSMENT: PAIN_FUNCTIONAL_ASSESSMENT: 0-10

## 2024-04-23 NOTE — PROGRESS NOTES
Physical Therapy Treatment    Patient Name: Melany Garcia  MRN: [id number]  Today's Date: 4/23/2024  Time Calculation  Start Time: 1031  Stop Time: 1115  Time Calculation (min): 44 min  PT Therapeutic Procedures Time Entry  Neuromuscular Re-Education Time Entry: 6  Therapeutic Exercise Time Entry: 38,      Current Problem  1. Rotator cuff tear  Follow Up In Physical Therapy          Insurance:  Number of Treatments Authorized: 10 of 30          Subjective   General  Reason for Referral: s/p R RCR (3/11/24)  Referred By: Romulo Guillen DO  Past Medical History Relevant to Rehab: Hx of diabetes, HTN, RA, Liver disease, Anemia  General Comment: Patient reports no new complaints this visit. No increased pain or adverse responses following her previous session.    Performing HEP?: Yes    Precautions  Precautions  Precautions Comment: None  Pain  Pain Assessment: 0-10  Pain Score: 0 - No pain  Pain Location: Shoulder  Pain Orientation: Right    Objective   Shoulder  Shoulder AROM  R Shoulder flexion: (180°): 132  R shoulder abduction: (180°): 112  Shoulder Strength  R shoulder flexion: (5/5): 3+/5  L shoulder flexion: (5/5): 4+/5  R shoulder abduction: (5/5): 3+/5  L shoulder abduction: (5/5): 4+/5  R shoulder ER: (5/5): 4/5  L shoulder ER: (5/5): 4/5  R shoulder IR: (5/5) : 4/5  L shoulder IR: (5/5): 4/5    Outcome Measures:  Other Measures  Disability of Arm Shoulder Hand (DASH): 54.6    Treatments:    Therapeutic Exercise  Therapeutic Exercise Activity 1: SciFit: L2: fwd/back x3' each  Therapeutic Exercise Activity 2: Supine Shoulder Flexion AAROM: x20 w dowel  Therapeutic Exercise Activity 3: Supine Shoulder ER AAROM: x20 w dowel  Therapeutic Exercise Activity 4: Serratus Punch: x20 w 2#  Therapeutic Exercise Activity 5: Sidelying ER: x20 w 1#  Therapeutic Exercise Activity 6: Sidelying Full Can Abd: 2x10 w 1#  Therapeutic Exercise Activity 7: Standing Rows: x20 w RTB  Therapeutic Exercise Activity 8:  Bilateral Shoulder Ext: x20 w RTB  Therapeutic Exercise Activity 9: [place] Ball on the wall walks: x10 into flexion    Balance/Neuromuscular Re-Education  Balance/Neuromuscular Re-Education Activity 1: Ball on the wall stabilization: up/down; side/side; CW/CCW x20 each    OP EDUCATION:  Outpatient Education  Education Comment: Access Code: [email or web address]  URL: [email or web address]  Date: 04/23/2024  Prepared by: Pavan Maki    Exercises  - Single Arm Serratus Punches in Supine with Dumbbell  - 1 x daily - 7 x weekly - 2 sets - 10 reps  - Sidelying Shoulder External Rotation Dumbbell  - 1 x daily - 7 x weekly - 2 sets - 10 reps  - Sidelying Shoulder Abduction Palm Forward  - 1 x daily - 7 x weekly - 2 sets - 10 reps  - Standing Shoulder Row with Anchored Resistance  - 1 x daily - 7 x weekly - 2-3 sets - 10 reps  - Shoulder extension with resistance - Neutral  - 1 x daily - 7 x weekly - 2-3 sets - 10 reps    Assessment:  PT Assessment  Assessment Comment: Patient has attended 10 sessions of skilled physical therapy intervention. Since initiating her plan of care, patient has demonstrated great progress. Progress is noted objectively with improved right shoulder strength and AROM measurements. Progress is noted subjectively with improved QuickDASH score and patient reports. Patient continues to display limited strength of right shoulder, in addition to limited AROM. These deficits impair patient's ability to reach overhead, style her hair and perform other activities of daily living. Therefore, patient will continue to benefit from skilled physical therapy intervention in order to address these deficits and improve overall quality of life.    Plan:  OP PT Plan  PT Plan: Skilled PT (Continue with current POC. Progress strength and AROM as tolerated.)  PT Frequency: 2 times per week  Duration: 10 weeks  Onset Date: 03/11/24  Number of Treatments Authorized: 10 of 30  Rehab Potential: Good  Plan of Care  Agreement: Patient    Goals:  Active       PT Problem       Patient to score 10% or less on QuickDASH in order to show improvement in overall quality of life. (Progressing)       Start:  03/20/24    Expected End:  05/29/24            Patient to achieve 160 degrees of right shoulder flexion and abduction AROM in order to be able to reach overhead. (Progressing)       Start:  03/20/24    Expected End:  05/29/24            Patient to achieve 4+/5 strength in right shoulder globally in order to perform daily tasks without difficulty. (Progressing)       Start:  03/20/24    Expected End:  05/29/24            Patient to demonstrate independence with HEP in order to establish self management of post-op recovery.  (Progressing)       Start:  03/20/24    Expected End:  05/29/24                 Pavan Maki, PT

## 2024-04-24 ENCOUNTER — PROCEDURE VISIT (OUTPATIENT)
Dept: UROLOGY | Facility: CLINIC | Age: 64
End: 2024-04-24
Payer: COMMERCIAL

## 2024-04-24 DIAGNOSIS — N32.81 OAB (OVERACTIVE BLADDER): Primary | ICD-10-CM

## 2024-04-24 LAB
POC APPEARANCE, URINE: CLEAR
POC BILIRUBIN, URINE: NEGATIVE
POC BLOOD, URINE: NEGATIVE
POC COLOR, URINE: YELLOW
POC GLUCOSE, URINE: NEGATIVE MG/DL
POC KETONES, URINE: NEGATIVE MG/DL
POC LEUKOCYTES, URINE: NEGATIVE
POC NITRITE,URINE: NEGATIVE
POC PH, URINE: 5.5 PH
POC PROTEIN, URINE: NEGATIVE MG/DL
POC SPECIFIC GRAVITY, URINE: 1.02
POC UROBILINOGEN, URINE: 0.2 EU/DL

## 2024-04-24 PROCEDURE — 51741 ELECTRO-UROFLOWMETRY FIRST: CPT | Performed by: UROLOGY

## 2024-04-24 PROCEDURE — 51729 CYSTOMETROGRAM W/VP&UP: CPT | Performed by: UROLOGY

## 2024-04-24 PROCEDURE — 51784 ANAL/URINARY MUSCLE STUDY: CPT | Performed by: UROLOGY

## 2024-04-24 PROCEDURE — 51797 INTRAABDOMINAL PRESSURE TEST: CPT | Performed by: UROLOGY

## 2024-04-24 PROCEDURE — 81003 URINALYSIS AUTO W/O SCOPE: CPT | Performed by: UROLOGY

## 2024-04-24 NOTE — PROGRESS NOTES
Melany Radha 62 y/o female    Patient was referred by Dr. Campos to evaluate OAB.  Dr. Dumont was present in the office at time of study.      Pre uroflow was completed today with a PVR of 100 ml.  Urine was clear for UTI.      Patient did not leak on CLPP/VLPP.  Patient did not have DO with leak.  PVR after study was 0ml.     Patient instructed to increase fluids if burning or blood in the urine due to cath insertion.  Patient will follow up with Dr. Campos to review results 04/24/24 CM

## 2024-04-25 ENCOUNTER — TREATMENT (OUTPATIENT)
Dept: PHYSICAL THERAPY | Facility: CLINIC | Age: 64
End: 2024-04-25
Payer: COMMERCIAL

## 2024-04-25 DIAGNOSIS — M75.100 ROTATOR CUFF TEAR: ICD-10-CM

## 2024-04-25 PROCEDURE — 97112 NEUROMUSCULAR REEDUCATION: CPT | Mod: GP | Performed by: PHYSICAL MEDICINE & REHABILITATION

## 2024-04-25 PROCEDURE — 97110 THERAPEUTIC EXERCISES: CPT | Mod: GP | Performed by: PHYSICAL MEDICINE & REHABILITATION

## 2024-04-25 ASSESSMENT — PAIN - FUNCTIONAL ASSESSMENT: PAIN_FUNCTIONAL_ASSESSMENT: 0-10

## 2024-04-25 ASSESSMENT — PAIN SCALES - GENERAL: PAINLEVEL_OUTOF10: 0 - NO PAIN

## 2024-04-25 NOTE — PROGRESS NOTES
Physical Therapy Treatment    Patient Name: Melany Garcia  MRN: [id number]  Today's Date: 4/25/2024  Time Calculation  Start Time: 0929  Stop Time: 1007  Time Calculation (min): 38 min  PT Therapeutic Procedures Time Entry  Neuromuscular Re-Education Time Entry: 4  Therapeutic Exercise Time Entry: 31  Therapeutic Activity Time Entry: 3,      Current Problem  1. Rotator cuff tear  Follow Up In Physical Therapy          Insurance:  Number of Treatments Authorized: 11 of 30          Subjective   General  Reason for Referral: s/p R RCR (3/11/24)  Referred By: Romulo Guillen DO  Past Medical History Relevant to Rehab: Hx of diabetes, HTN, RA, Liver disease, Anemia  General Comment: Patient reports no new complaints this visit. No increased pain or adverse responses following her previous session.    Performing HEP?: Yes    Precautions  Precautions  Precautions Comment: None  Pain  Pain Assessment: 0-10  Pain Score: 0 - No pain  Pain Location: Shoulder  Pain Orientation: Right    Objective   Treatments:    Therapeutic Exercise  Therapeutic Exercise Activity 1: SciFit: L2: fwd/back x3' each  Therapeutic Exercise Activity 2: Supine Shoulder Flexion AAROM: x20 w dowel  Therapeutic Exercise Activity 3: Supine Shoulder ER AAROM: x20 w dowel  Therapeutic Exercise Activity 4: Serratus Punch: x20 w 3#  Therapeutic Exercise Activity 5: Sidelying ER: x20 w 1#  Therapeutic Exercise Activity 6: Sidelying Full Can Abd: 2x10 w 1#  Therapeutic Exercise Activity 7: Prone Rows: x20 w 3#  Therapeutic Exercise Activity 8: Prone Ext: x20 w 2#  Therapeutic Exercise Activity 9: Prone I's, Y's and T's: 2x10 each  Therapeutic Exercise Activity 10: Standing Shoulder Abduction AAROM: x20 w dowel  Therapeutic Exercise Activity 11: Seated One-Arm Pulldowns: x20 w 7.5# at Matrix  Therapeutic Exercise Activity 12: Mexican Ball on the wall walks: x10 into flexion    Balance/Neuromuscular Re-Education  Balance/Neuromuscular Re-Education Activity  1: Ball on the wall stabilization: up/down; side/side; CW/CCW x20 each    Therapeutic Activity  Therapeutic Activity 1: Shelf Taps: x20 w 1#    Assessment:  PT Assessment  Assessment Comment: Today's visit focused on progressing activities to improve patient's right shoulder strength, stability and AROM. Patient demonstrated good effort toward today's progressions. No increased pain reported at the conclusion of the session. Overall response toward today's interventions was great.    Plan:  OP PT Plan  PT Plan: Skilled PT (Continue with current POC. Progress strength and AROM as tolerated.)  PT Frequency: 2 times per week  Duration: 10 weeks  Onset Date: 03/11/24  Number of Treatments Authorized: 11 of 30  Rehab Potential: Good  Plan of Care Agreement: Patient    Goals:  Active       PT Problem       Patient to score 10% or less on QuickDASH in order to show improvement in overall quality of life. (Progressing)       Start:  03/20/24    Expected End:  05/29/24            Patient to achieve 160 degrees of right shoulder flexion and abduction AROM in order to be able to reach overhead. (Progressing)       Start:  03/20/24    Expected End:  05/29/24            Patient to achieve 4+/5 strength in right shoulder globally in order to perform daily tasks without difficulty. (Progressing)       Start:  03/20/24    Expected End:  05/29/24            Patient to demonstrate independence with HEP in order to establish self management of post-op recovery.  (Progressing)       Start:  03/20/24    Expected End:  05/29/24                 Pavan Maki, PT

## 2024-04-29 ENCOUNTER — APPOINTMENT (OUTPATIENT)
Dept: PHYSICAL THERAPY | Facility: CLINIC | Age: 64
End: 2024-04-29
Payer: COMMERCIAL

## 2024-05-01 ENCOUNTER — TREATMENT (OUTPATIENT)
Dept: PHYSICAL THERAPY | Facility: CLINIC | Age: 64
End: 2024-05-01
Payer: COMMERCIAL

## 2024-05-01 DIAGNOSIS — M75.100 ROTATOR CUFF TEAR: ICD-10-CM

## 2024-05-01 PROCEDURE — 97110 THERAPEUTIC EXERCISES: CPT | Mod: GP | Performed by: PHYSICAL MEDICINE & REHABILITATION

## 2024-05-01 PROCEDURE — 97112 NEUROMUSCULAR REEDUCATION: CPT | Mod: GP | Performed by: PHYSICAL MEDICINE & REHABILITATION

## 2024-05-01 ASSESSMENT — PAIN SCALES - GENERAL: PAINLEVEL_OUTOF10: 0 - NO PAIN

## 2024-05-01 ASSESSMENT — PAIN - FUNCTIONAL ASSESSMENT: PAIN_FUNCTIONAL_ASSESSMENT: 0-10

## 2024-05-01 NOTE — PROGRESS NOTES
Physical Therapy Treatment    Patient Name: Melany Garcia  MRN: [id number]  Today's Date: 5/1/2024  Time Calculation  Start Time: 1316  Stop Time: 1359  Time Calculation (min): 43 min  PT Therapeutic Procedures Time Entry  Neuromuscular Re-Education Time Entry: 8  Therapeutic Exercise Time Entry: 33  Therapeutic Activity Time Entry: 2,      Current Problem  1. Rotator cuff tear  Follow Up In Physical Therapy          Insurance:  Number of Treatments Authorized: 12 of 30          Subjective   General  Reason for Referral: s/p R RCR (3/11/24)  Referred By: Romulo Guillen DO  Past Medical History Relevant to Rehab: Hx of diabetes, HTN, RA, Liver disease, Anemia  General Comment: Patient reports experiencing a slight twinge in her right shoulder after bending over to  an object from the floor. She notes that she will occasionally feel the same twinge if she tries to repeat the same movement.    Performing HEP?: Yes    Precautions  Precautions  Precautions Comment: None  Pain  Pain Assessment: 0-10  Pain Score: 0 - No pain  Pain Location: Shoulder  Pain Orientation: Right    Objective   Treatments:    Therapeutic Exercise  Therapeutic Exercise Activity 1: SciFit: L2: fwd/back x3' each  Therapeutic Exercise Activity 2: Supine Shoulder Flexion AAROM: x20 w dowel  Therapeutic Exercise Activity 3: Supine Shoulder ER AAROM: x20 w dowel  Therapeutic Exercise Activity 4: Serratus Punch: x20 w 4#  Therapeutic Exercise Activity 5: Sidelying ER: x20 w 2#  Therapeutic Exercise Activity 6: Sidelying Full Can Abd: 2x10 w 1#  Therapeutic Exercise Activity 7: Prone Rows: x20 w 4#  Therapeutic Exercise Activity 8: Prone Ext: x20 w 2#  Therapeutic Exercise Activity 9: Prone I's, Y's and T's: 2x10 each  Therapeutic Exercise Activity 10: Standing Shoulder Abduction AAROM: x20 w dowel  Therapeutic Exercise Activity 11: Seated One-Arm Pulldowns: x20 w 10# at Matrix  Therapeutic Exercise Activity 12: Citizen of [place] Ball on the wall  "walks: x10 into flexion    Balance/Neuromuscular Re-Education  Balance/Neuromuscular Re-Education Activity 1: Ball on the wall stabilization: up/down; side/side; CW/CCW x20 each  Balance/Neuromuscular Re-Education Activity 2: BodyBlade: ER/IR at 0: 3x30\"    Therapeutic Activity  Therapeutic Activity 1: Shelf Taps: x20 w 2#    Assessment:  PT Assessment  Assessment Comment: Today's visit focused on progressing activities to improve patient's right shoulder strength, stability and AROM. Patient demonstrated good effort toward today's progressions. No increased pain reported at the conclusion of the session. Overall response toward today's interventions was great.    Plan:  OP PT Plan  PT Plan: Skilled PT (Continue with current POC. Progress strength and AROM as tolerated.)  PT Frequency: 2 times per week  Duration: 10 weeks  Onset Date: 03/11/24  Number of Treatments Authorized: 12 of 30  Rehab Potential: Good  Plan of Care Agreement: Patient    Goals:  Active       PT Problem       Patient to score 10% or less on QuickDASH in order to show improvement in overall quality of life. (Progressing)       Start:  03/20/24    Expected End:  05/29/24            Patient to achieve 160 degrees of right shoulder flexion and abduction AROM in order to be able to reach overhead. (Progressing)       Start:  03/20/24    Expected End:  05/29/24            Patient to achieve 4+/5 strength in right shoulder globally in order to perform daily tasks without difficulty. (Progressing)       Start:  03/20/24    Expected End:  05/29/24            Patient to demonstrate independence with HEP in order to establish self management of post-op recovery.  (Progressing)       Start:  03/20/24    Expected End:  05/29/24                 Pavan Maki, PT  "

## 2024-05-02 ENCOUNTER — CLINICAL SUPPORT (OUTPATIENT)
Dept: SLEEP MEDICINE | Facility: CLINIC | Age: 64
End: 2024-05-02
Payer: COMMERCIAL

## 2024-05-02 DIAGNOSIS — G47.33 OBSTRUCTIVE SLEEP APNEA: Primary | ICD-10-CM

## 2024-05-02 DIAGNOSIS — G47.61 PERIODIC LIMB MOVEMENT DISORDER: ICD-10-CM

## 2024-05-02 PROCEDURE — 95810 POLYSOM 6/> YRS 4/> PARAM: CPT | Performed by: PSYCHIATRY & NEUROLOGY

## 2024-05-03 ENCOUNTER — TREATMENT (OUTPATIENT)
Dept: PHYSICAL THERAPY | Facility: CLINIC | Age: 64
End: 2024-05-03
Payer: COMMERCIAL

## 2024-05-03 VITALS
SYSTOLIC BLOOD PRESSURE: 127 MMHG | RESPIRATION RATE: 20 BRPM | DIASTOLIC BLOOD PRESSURE: 83 MMHG | HEIGHT: 69 IN | OXYGEN SATURATION: 94 % | WEIGHT: 235.01 LBS | BODY MASS INDEX: 34.81 KG/M2

## 2024-05-03 DIAGNOSIS — M75.100 ROTATOR CUFF TEAR: ICD-10-CM

## 2024-05-03 PROCEDURE — 97140 MANUAL THERAPY 1/> REGIONS: CPT | Mod: GP,CQ

## 2024-05-03 PROCEDURE — 97110 THERAPEUTIC EXERCISES: CPT | Mod: GP,CQ

## 2024-05-03 ASSESSMENT — PAIN SCALES - GENERAL: PAINLEVEL_OUTOF10: 0 - NO PAIN

## 2024-05-03 ASSESSMENT — PAIN - FUNCTIONAL ASSESSMENT: PAIN_FUNCTIONAL_ASSESSMENT: 0-10

## 2024-05-03 NOTE — PROGRESS NOTES
[facility] TECH NOTE:     Patient: Melany Garcia   MRN//AGE: [id number]  1960  63 y.o.   Technologist: Rita Mccurdy   Room: 1   Service Date: 2024        Sleep Testing Location: [facility]  Neck: 18  [place]: No data recorded    TECHNOLOGIST SLEEP STUDY PROCEDURE NOTE:   This sleep study is being conducted according to the policies and procedures outlined by the AAS accreditation standards.  The sleep study procedure and processes involved during this appointment was explained to the patient/patient’s family, questions were answered. The patient/family verbalized understanding.      The patient is a 63 year old female scheduled for a split night study.    The study that was ultimately completed was a PSG    The full study was completed.  Patient questionnaires completed?: yes     Consents signed? yes    Initial Fall Risk Screening:     Melany has not fallen in the last 6 months. Melany does have a fear of falling due to balance issues. She does not need assistance with sitting, standing, or walking. She does not need assistance walking in her home. She does not need assistance in an unfamiliar setting. The patient is not using an assistive device.     Brief Study observations: Split criteria not met prior to 0300.  Study completed as a PSG.     After the procedure, the patient/family was informed to ensure followup with ordering clinician for testing results.      Technologist: Rita Mccurdy

## 2024-05-03 NOTE — PROGRESS NOTES
Physical Therapy Treatment    Patient Name: Melany Garcia  MRN: [id number]  Today's Date: 5/3/2024  Time Calculation  Start Time: 0815  Stop Time: 0857  Time Calculation (min): 42 min  PT Therapeutic Procedures Time Entry  Manual Therapy Time Entry: 19  Therapeutic Exercise Time Entry: 23,      Current Problem  1. Rotator cuff tear  Follow Up In Physical Therapy            Insurance:  Payor:  EMPLOYEE MEDICAL PLAN / Plan:  EMPLOYEE MEDICAL PLAN HEALTHY CHOICE / Product Type: *No Product type* /   Number of Treatments Authorized: 13 of 30          Subjective   General  Reason for Referral: s/p R RCR (3/11/24)  Referred By: Romulo Guillen DO  Past Medical History Relevant to Rehab: Hx of diabetes, HTN, RA, Liver disease, Anemia  General Comment: PT STATES SHE STILLGETS TWINGES OF PAIN WITH CERTAIN MOTIONS AND LIFTING.    Performing HEP?: Yes    Precautions  Precautions  Precautions Comment: None  Pain  Pain Assessment: 0-10  Pain Score: 0 - No pain  Pain Location: Shoulder  Pain Orientation: Right    Objective   General Observation  General Observation: FWD POSTURE       Treatments:    Therapeutic Exercise  Therapeutic Exercise Activity 1: SciFit UE F/B HILLS L2 X 6 MIN  Therapeutic Exercise Activity 2: PEC STRETCH LOW - MID X 1 MIN EACH  Therapeutic Exercise Activity 3: WALL SLIDES SH FLEX X 2 MIN  Therapeutic Exercise Activity 4: WALL SLIDES ABC'S  Therapeutic Exercise Activity 5: MATRIX SH EXT 5# X 1 MIN  Therapeutic Exercise Activity 6: MATRIX ROWS 5# X 1 MIN  Therapeutic Exercise Activity 7: SH FLEX TO H ABD 0# X  1 MIN  Therapeutic Exercise Activity 8: PEG BOARD TOP 4 ROWS  Therapeutic Exercise Activity 9: SPO 4# X 1MIN         Manual Therapy  Manual Therapy Activity 1: MFR R ARM PULL  Manual Therapy Activity 2: STM R PEC, BICEP, UT, LEV SCAP, TERES  Manual Therapy Activity 3: PROM R SH FLEX, ER, IR                   OP EDUCATION:  Outpatient Education  Education Comment: CONTINUE WITH CURRENT  HEP    Assessment:  PT Assessment  Assessment Comment: PT MIGUELITO EX'S WELL.  SHE IS STILL TIGHT AND TENDER IN HER L BICEP.  ALSO HAS MIN END ROM PAIN.  PT CONTINUES TO PROGRESS TOWARDS GOALS.    Plan:  OP PT Plan  PT Plan: Skilled PT (Continue with current POC. Progress strength and AROM as tolerated.)  PT Frequency: 2 times per week  Duration: 10 weeks  Onset Date: 03/11/24  Number of Treatments Authorized: 13 of 30  Rehab Potential: Good  Plan of Care Agreement: Patient    Goals:  Active       PT Problem       Patient to score 10% or less on QuickDASH in order to show improvement in overall quality of life. (Progressing)       Start:  03/20/24    Expected End:  05/29/24            Patient to achieve 160 degrees of right shoulder flexion and abduction AROM in order to be able to reach overhead. (Progressing)       Start:  03/20/24    Expected End:  05/29/24            Patient to achieve 4+/5 strength in right shoulder globally in order to perform daily tasks without difficulty. (Progressing)       Start:  03/20/24    Expected End:  05/29/24            Patient to demonstrate independence with HEP in order to establish self management of post-op recovery.  (Progressing)       Start:  03/20/24    Expected End:  05/29/24                 Burke Morris, PTA

## 2024-05-06 ENCOUNTER — APPOINTMENT (OUTPATIENT)
Dept: PHYSICAL THERAPY | Facility: CLINIC | Age: 64
End: 2024-05-06
Payer: COMMERCIAL

## 2024-05-06 DIAGNOSIS — G47.33 OBSTRUCTIVE SLEEP APNEA: Primary | ICD-10-CM

## 2024-05-06 NOTE — RESULT ENCOUNTER NOTE
Please call the patient regarding her abnormal sleep study.  It showed moderate obstructive sleep apnea.  I had ordered a split-night titration but they were unable to document the degree of sleep apnea early enough in the night.  Please arrange for a CPAP titration as the patient has not felt she is doing well with her current AutoPap

## 2024-05-08 ENCOUNTER — TREATMENT (OUTPATIENT)
Dept: PHYSICAL THERAPY | Facility: CLINIC | Age: 64
End: 2024-05-08
Payer: COMMERCIAL

## 2024-05-08 ENCOUNTER — HOSPITAL ENCOUNTER (OUTPATIENT)
Dept: RADIOLOGY | Facility: CLINIC | Age: 64
Discharge: HOME | End: 2024-05-08
Payer: COMMERCIAL

## 2024-05-08 DIAGNOSIS — R91.1 LUNG NODULE: ICD-10-CM

## 2024-05-08 DIAGNOSIS — M75.100 ROTATOR CUFF TEAR: ICD-10-CM

## 2024-05-08 PROCEDURE — 71250 CT THORAX DX C-: CPT

## 2024-05-08 PROCEDURE — 97112 NEUROMUSCULAR REEDUCATION: CPT | Mod: GP | Performed by: PHYSICAL MEDICINE & REHABILITATION

## 2024-05-08 PROCEDURE — 71250 CT THORAX DX C-: CPT | Performed by: RADIOLOGY

## 2024-05-08 PROCEDURE — 97110 THERAPEUTIC EXERCISES: CPT | Mod: GP | Performed by: PHYSICAL MEDICINE & REHABILITATION

## 2024-05-08 ASSESSMENT — PAIN - FUNCTIONAL ASSESSMENT: PAIN_FUNCTIONAL_ASSESSMENT: 0-10

## 2024-05-08 ASSESSMENT — PAIN SCALES - GENERAL: PAINLEVEL_OUTOF10: 0 - NO PAIN

## 2024-05-08 NOTE — PROGRESS NOTES
Physical Therapy Treatment    Patient Name: Melany Garcia  MRN: [id number]  Today's Date: 5/8/2024  Time Calculation  Start Time: 1400  Stop Time: 1441  Time Calculation (min): 41 min  PT Therapeutic Procedures Time Entry  Neuromuscular Re-Education Time Entry: 8  Therapeutic Exercise Time Entry: 30  Therapeutic Activity Time Entry: 3,      Current Problem  1. Rotator cuff tear  Follow Up In Physical Therapy          Insurance:  Number of Treatments Authorized: 14 of 30          Subjective   General  Reason for Referral: s/p R RCR (3/11/24)  Referred By: Romulo Guillen DO  Past Medical History Relevant to Rehab: Hx of diabetes, HTN, RA, Liver disease, Anemia  General Comment: Patient reports no new complaints this visit. No increased pain or adverse responses following her previous session.    Performing HEP?: Yes    Precautions  Precautions  Precautions Comment: None  Pain  Pain Assessment: 0-10  Pain Score: 0 - No pain  Pain Location: Shoulder  Pain Orientation: Right    Objective   Shoulder  Shoulder AROM  R Shoulder flexion: (180°): 145  R shoulder abduction: (180°): 150    Treatments:    Therapeutic Exercise  Therapeutic Exercise Activity 1: SciFit: L2: fwd/back x3' each  Therapeutic Exercise Activity 2: Supine Shoulder Flexion AAROM: x20 w dowel  Therapeutic Exercise Activity 3: Supine Shoulder ER AAROM: x20 w dowel  Therapeutic Exercise Activity 4: Serratus Punch: x20 w 5#  Therapeutic Exercise Activity 5: Sidelying ER: x20 w 3#  Therapeutic Exercise Activity 6: Sidelying Full Can Abd: 2x10 w 1#  Therapeutic Exercise Activity 7: Prone Rows: x20 w 5#  Therapeutic Exercise Activity 8: Prone Ext: x20 w 3#  Therapeutic Exercise Activity 9: Prone I's, Y's and T's: 2x10 each w 1#  Therapeutic Exercise Activity 10: Standing Shoulder Abduction AAROM: x20 w dowel  Therapeutic Exercise Activity 11: Seated One-Arm Pulldowns: x20 w 12.5# at Matrix  Therapeutic Exercise Activity 12: Filipino Ball on the wall walks:  x10 into flexion    Balance/Neuromuscular Re-Education  Balance/Neuromuscular Re-Education Activity 1: Ball on the wall stabilization: up/down; side/side; CW/CCW x20 each  Balance/Neuromuscular Re-Education Activity 2: Alt shoulder taps: x20 each side at edge of table    Therapeutic Activity  Therapeutic Activity 1: Shelf Taps: x20 w 3#    Assessment:  PT Assessment  Assessment Comment: Today's visit focused on progressing activities to improve patient's right shoulder strength, ROM and functional ability. Patient demonstrated good effort toward today's progressions. Improved right shoulder flexion and abduction AROM was measured during today's visit. No increased pain reported at the conclusion of the session. Overall response toward today's interventions was great.    Plan:  OP PT Plan  PT Plan: Skilled PT (Continue with current POC. Progress strength and AROM as tolerated.)  PT Frequency: 2 times per week  Duration: 10 weeks  Onset Date: 03/11/24  Number of Treatments Authorized: 14 of 30  Rehab Potential: Good  Plan of Care Agreement: Patient    Goals:  Active       PT Problem       Patient to score 10% or less on QuickDASH in order to show improvement in overall quality of life. (Progressing)       Start:  03/20/24    Expected End:  05/29/24            Patient to achieve 160 degrees of right shoulder flexion and abduction AROM in order to be able to reach overhead. (Progressing)       Start:  03/20/24    Expected End:  05/29/24            Patient to achieve 4+/5 strength in right shoulder globally in order to perform daily tasks without difficulty. (Progressing)       Start:  03/20/24    Expected End:  05/29/24            Patient to demonstrate independence with HEP in order to establish self management of post-op recovery.  (Progressing)       Start:  03/20/24    Expected End:  05/29/24                 Pavan Maki, PT

## 2024-05-10 ENCOUNTER — TREATMENT (OUTPATIENT)
Dept: PHYSICAL THERAPY | Facility: CLINIC | Age: 64
End: 2024-05-10
Payer: COMMERCIAL

## 2024-05-10 DIAGNOSIS — M75.100 ROTATOR CUFF TEAR: ICD-10-CM

## 2024-05-10 PROCEDURE — 97110 THERAPEUTIC EXERCISES: CPT | Mod: GP,CQ

## 2024-05-10 PROCEDURE — 97140 MANUAL THERAPY 1/> REGIONS: CPT | Mod: GP,CQ

## 2024-05-10 ASSESSMENT — PAIN SCALES - GENERAL: PAINLEVEL_OUTOF10: 0 - NO PAIN

## 2024-05-10 ASSESSMENT — PAIN - FUNCTIONAL ASSESSMENT: PAIN_FUNCTIONAL_ASSESSMENT: 0-10

## 2024-05-10 NOTE — PROGRESS NOTES
Physical Therapy Treatment    Patient Name: Melany Garcia  MRN: [id number]  Today's Date: 5/10/2024  Time Calculation  Start Time: 1121  Stop Time: 1200  Time Calculation (min): 39 min  PT Therapeutic Procedures Time Entry  Manual Therapy Time Entry: 10  Therapeutic Exercise Time Entry: 29,      Current Problem  1. Rotator cuff tear  Follow Up In Physical Therapy            Insurance:  Payor:  EMPLOYEE MEDICAL PLAN / Plan:  EMPLOYEE MEDICAL PLAN HEALTHY CHOICE / Product Type: *No Product type* /   Number of Treatments Authorized: 15 of 30          Subjective   General  Reason for Referral: s/p R RCR (3/11/24)  Referred By: Romulo Guillen DO  Past Medical History Relevant to Rehab: Hx of diabetes, HTN, RA, Liver disease, Anemia  General Comment: PT STATES HER R SHOULDER IS DOING WELL.    Performing HEP?: Yes    Precautions  Precautions  Precautions Comment: None  Pain  Pain Assessment: 0-10  Pain Score: 0 - No pain  Pain Location: Shoulder  Pain Orientation: Right    Objective   General Observation  General Observation: FWD POSTURE         Treatments:    Therapeutic Exercise  Therapeutic Exercise Activity 1: SciFit: L2: fwd/back x3' each  Therapeutic Exercise Activity 2: PEC STRETCH LOW - MID X 1 MIN EACH  Therapeutic Exercise Activity 3: WALL SLIDES SH FLEX X 2 MIN  Therapeutic Exercise Activity 4: WALL SLIDES ABC'S  Therapeutic Exercise Activity 5: MATRIX SH EXT 5# X 1 MIN  Therapeutic Exercise Activity 6: MATRIX ROWS 5# X 1 MIN  Therapeutic Exercise Activity 7: SH FLEX TO H ABD 0# X  1 MIN  Therapeutic Exercise Activity 8: SUPINE H ABD RED TBAND X 1 MIN  Therapeutic Exercise Activity 9: SPO 4# X 1 MIN         Manual Therapy  Manual Therapy Activity 1: MFR R ARM PULL  Manual Therapy Activity 2: STM R PEC, BICEP, UT, LEV SCAP, TERES  Manual Therapy Activity 3: PROM R SH FLEX, ER, IR                   OP EDUCATION:  Outpatient Education  Education Comment: CONTINUE WITH CURRENT HEP    Assessment:  PT  Assessment  Assessment Comment: PT MIGUELITO EX'S WELL. SHE CONTINUES TO IMPROVE WITH HER STRENGTH AND END ROM.  STILL HAS TIGHTNESS IN HER R BICEP, PEC AND UT.    Plan:  OP PT Plan  PT Plan: Skilled PT (Continue with current POC. Progress strength and AROM as tolerated.)  PT Frequency: 2 times per week  Duration: 10 weeks  Onset Date: 03/11/24  Number of Treatments Authorized: 15 of 30  Rehab Potential: Good  Plan of Care Agreement: Patient    Goals:  Active       PT Problem       Patient to score 10% or less on QuickDASH in order to show improvement in overall quality of life. (Progressing)       Start:  03/20/24    Expected End:  05/29/24            Patient to achieve 160 degrees of right shoulder flexion and abduction AROM in order to be able to reach overhead. (Progressing)       Start:  03/20/24    Expected End:  05/29/24            Patient to achieve 4+/5 strength in right shoulder globally in order to perform daily tasks without difficulty. (Progressing)       Start:  03/20/24    Expected End:  05/29/24            Patient to demonstrate independence with HEP in order to establish self management of post-op recovery.  (Progressing)       Start:  03/20/24    Expected End:  05/29/24                 Burke Morris, PTA

## 2024-05-13 ENCOUNTER — PROCEDURE VISIT (OUTPATIENT)
Dept: UROLOGY | Facility: CLINIC | Age: 64
End: 2024-05-13
Payer: COMMERCIAL

## 2024-05-13 ENCOUNTER — TREATMENT (OUTPATIENT)
Dept: PHYSICAL THERAPY | Facility: CLINIC | Age: 64
End: 2024-05-13
Payer: COMMERCIAL

## 2024-05-13 DIAGNOSIS — N32.81 OAB (OVERACTIVE BLADDER): Primary | ICD-10-CM

## 2024-05-13 DIAGNOSIS — M75.100 ROTATOR CUFF TEAR: ICD-10-CM

## 2024-05-13 LAB
POC APPEARANCE, URINE: CLEAR
POC BILIRUBIN, URINE: NEGATIVE
POC BLOOD, URINE: NEGATIVE
POC COLOR, URINE: YELLOW
POC GLUCOSE, URINE: NEGATIVE MG/DL
POC KETONES, URINE: NEGATIVE MG/DL
POC LEUKOCYTES, URINE: ABNORMAL
POC NITRITE,URINE: NEGATIVE
POC PH, URINE: 5.5 PH
POC PROTEIN, URINE: NEGATIVE MG/DL
POC SPECIFIC GRAVITY, URINE: >=1.03
POC UROBILINOGEN, URINE: 0.2 EU/DL

## 2024-05-13 PROCEDURE — 51797 INTRAABDOMINAL PRESSURE TEST: CPT | Performed by: STUDENT IN AN ORGANIZED HEALTH CARE EDUCATION/TRAINING PROGRAM

## 2024-05-13 PROCEDURE — 51729 CYSTOMETROGRAM W/VP&UP: CPT | Performed by: STUDENT IN AN ORGANIZED HEALTH CARE EDUCATION/TRAINING PROGRAM

## 2024-05-13 PROCEDURE — 51741 ELECTRO-UROFLOWMETRY FIRST: CPT | Performed by: STUDENT IN AN ORGANIZED HEALTH CARE EDUCATION/TRAINING PROGRAM

## 2024-05-13 PROCEDURE — 97140 MANUAL THERAPY 1/> REGIONS: CPT | Mod: GP,CQ

## 2024-05-13 PROCEDURE — 97110 THERAPEUTIC EXERCISES: CPT | Mod: GP,CQ

## 2024-05-13 PROCEDURE — 99213 OFFICE O/P EST LOW 20 MIN: CPT | Performed by: STUDENT IN AN ORGANIZED HEALTH CARE EDUCATION/TRAINING PROGRAM

## 2024-05-13 PROCEDURE — 51784 ANAL/URINARY MUSCLE STUDY: CPT | Performed by: STUDENT IN AN ORGANIZED HEALTH CARE EDUCATION/TRAINING PROGRAM

## 2024-05-13 PROCEDURE — 52000 CYSTOURETHROSCOPY: CPT | Performed by: STUDENT IN AN ORGANIZED HEALTH CARE EDUCATION/TRAINING PROGRAM

## 2024-05-13 ASSESSMENT — PAIN - FUNCTIONAL ASSESSMENT: PAIN_FUNCTIONAL_ASSESSMENT: 0-10

## 2024-05-13 ASSESSMENT — PAIN SCALES - GENERAL: PAINLEVEL_OUTOF10: 0 - NO PAIN

## 2024-05-13 NOTE — PROGRESS NOTES
Physical Therapy Treatment    Patient Name: Melany Garcia  MRN: [id number]  Today's Date: 5/13/2024  Time Calculation  Start Time: 1304  Stop Time: 1345  Time Calculation (min): 41 min  PT Therapeutic Procedures Time Entry  Manual Therapy Time Entry: 10  Therapeutic Exercise Time Entry: 31,      Current Problem  1. Rotator cuff tear  Follow Up In Physical Therapy            Insurance:  Payor:  EMPLOYEE MEDICAL PLAN / Plan:  EMPLOYEE MEDICAL PLAN HEALTHY CHOICE / Product Type: *No Product type* /   Number of Treatments Authorized: 16 of 30          Subjective   General  Reason for Referral: s/p R RCR (3/11/24)  Referred By: Romulo Guillen DO  Past Medical History Relevant to Rehab: Hx of diabetes, HTN, RA, Liver disease, Anemia  General Comment: PT STATES HER SHOULDER CONTINUES TO IMPROVE SLOWLY.    Performing HEP?: Yes    Precautions  Precautions  Precautions Comment: None  Pain  Pain Assessment: 0-10  Pain Score: 0 - No pain  Pain Location: Shoulder  Pain Orientation: Right    Objective   General Observation  General Observation: FWD POSTURE       Treatments:    Therapeutic Exercise  Therapeutic Exercise Activity 1: SciFit: L3: fwd/back x3' each  Therapeutic Exercise Activity 2: PEC STRETCH LOW - MID X 1 MIN EACH  Therapeutic Exercise Activity 3: WALL SLIDES SH FLEX X 2 MIN  Therapeutic Exercise Activity 4: WALL SLIDES ABC'S  Therapeutic Exercise Activity 5: BODY BLADES SIDE, FRONT, SCAPTION X 1 MIN EACH  Therapeutic Exercise Activity 6: SH ER EXTRA LIGHT BAND 2 X 15  Therapeutic Exercise Activity 7: SH FLEX TO H ABD 0# X  1 MIN  Therapeutic Exercise Activity 8: SUPINE H ABD RED TBAND X 1 MIN  Therapeutic Exercise Activity 9: SPO 4# X 1 MIN  Therapeutic Exercise Activity 10: SUITCASE CARRY 15# KB 2 X 80'  Therapeutic Exercise Activity 11: BUTTERFLY X 5         Manual Therapy  Manual Therapy Activity 1: MFR R ARM PULL  Manual Therapy Activity 2: STM R PEC, BICEP, UT, LEV SCAP, TERES  Manual Therapy  Activity 3: PROM R SH FLEX, ER, IR                   OP EDUCATION:  Outpatient Education  Education Comment: Access Code: [email or web address]  URL: [email or web address]  Date: 05/13/2024  Prepared by: Geo Morris    Exercises  - BUTTERF LY ON YOUR BACK  - 1-2 x daily - 7 x weekly - 1 sets - 20 reps - 10 sec hold    Assessment:  PT Assessment  Assessment Comment: PT CONTINUES TO MIGUELITO EX'S WELL.  SHE HAS MIN R SHOULDER PAIN WHEN SHE PERFORMS PEC STRETCH MID HEIGHT.  OVERALL, SHE IS PROGRESSING WELL.    Plan:  OP PT Plan  PT Plan: Skilled PT (Continue with current POC. Progress strength and AROM as tolerated.)  PT Frequency: 2 times per week  Duration: 10 weeks  Onset Date: 03/11/24  Number of Treatments Authorized: 16 of 30  Rehab Potential: Good  Plan of Care Agreement: Patient    Goals:  Active       PT Problem       Patient to score 10% or less on QuickDASH in order to show improvement in overall quality of life. (Progressing)       Start:  03/20/24    Expected End:  05/29/24            Patient to achieve 160 degrees of right shoulder flexion and abduction AROM in order to be able to reach overhead. (Progressing)       Start:  03/20/24    Expected End:  05/29/24            Patient to achieve 4+/5 strength in right shoulder globally in order to perform daily tasks without difficulty. (Progressing)       Start:  03/20/24    Expected End:  05/29/24            Patient to demonstrate independence with HEP in order to establish self management of post-op recovery.  (Progressing)       Start:  03/20/24    Expected End:  05/29/24                 Burke Morris, PTA

## 2024-05-13 NOTE — PROGRESS NOTES
HISTORY OF PRESENT ILLNESS:  Melany Garcia is a 63 y.o. female who presents today for a cystoscopy.  UDS does not demonstrate any obstructive etiology to her symptoms, she does have some DO noted and normal emptying.         Past Medical History  She has a past medical history of A-fib (Multi), Acute urinary tract infection (11/28/2023), Adverse reaction to drug (11/28/2023), Allergic reaction (11/28/2023), Anemia, Ankylosing spondylitis (Multi), Delayed emergence from general anesthesia, Diabetes mellitus (Multi), Fever (11/28/2023), Gout, History of blood transfusion, Hyperlipidemia, Hypertension, Memory deficit, OA (osteoarthritis), RALPH on CPAP, PONV (postoperative nausea and vomiting), Psoriatic arthritis (Multi), Rotator cuff tear (11/28/2023), Vitamin D deficiency, and Wears glasses.    Surgical History  She has a past surgical history that includes Joint replacement (Bilateral); Hysterectomy; Rotator cuff repair (Left); Colonoscopy (2018); Knee arthroscopy w/ meniscal repair (Right); Oophorectomy (Right); Hernia repair; Foot Tendon Surgery (Bilateral); Nasal septum surgery; and [place] tooth extraction.     Social History  She reports that she has never smoked. She has never used smokeless tobacco. She reports that she does not drink alcohol and does not use drugs.    Family History  Family History   Problem Relation Name Age of Onset    Breast cancer Sister          Allergies  Amoxicillin, Articaine-epinephrine bitart, Benzocaine, and Epinephrine      A comprehensive 10+ review of systems was negative except for: see hpi                          PHYSICAL EXAMINATION:  BP Readings from Last 3 Encounters:   05/03/24 127/83   03/12/24 142/90   03/11/24 132/78      Wt Readings from Last 3 Encounters:   05/03/24 107 kg (235 lb 0.2 oz)   03/12/24 103 kg (227 lb)   03/11/24 104 kg (229 lb 4.5 oz)      BMI: Estimated body mass index is 35.21 kg/m² as calculated from the following:    Height as of 5/3/24: 1.74 m  "(5' 8.5\").    Weight as of 5/3/24: 107 kg (235 lb 0.2 oz).  BSA: Estimated body surface area is 2.27 meters squared as calculated from the following:    Height as of 5/3/24: 1.74 m (5' 8.5\").    Weight as of 5/3/24: 107 kg (235 lb 0.2 oz).  HEENT: Normocephalic, atraumatic, PER EOMI, nonicteric, trachea normal, thyroid normal, oropharynx normal.  CARDIAC: regular rate & rhythm, S1 & S2 normal.  No heaves, thrills, gallops or murmurs.  LUNGS: Clear to auscultation, no spinal or CV tenderness.  EXTREMITIES: No evidence of cyanosis, clubbing or edema.    Indication  LUTS    POST-OP DIAGNOSIS:   Same.  ANESTHESIA:    2% Lidocaine gel.  PROCEDURE:    Cystoscopy.  SURGEON:     solange    FINDINGS    Bladder:  normal  Trigone & Ureteral Orifices: normal.  Vesical Neck:  normal.  Urethra: normal.    PROCEDURE PHYSICIAN NOTE  I was present in the exam room  for the entire procedure as above. We reviewed procedure with patient along with the indications, options, alternatives,  risks of having and not having procedure,  benefits, and probability of success.  We answered the patients questions. We explained the expected post procedure symptoms including possible dysuria and infection.  Pt consented to have procedure.  The patient will follow up for discussion of her results.               Assessment:  Melany Garcia is a 63 y.o. who presents with OAB     -has failed multiple AC meds including vesicare and oxybutynin   -UDS shows detrusor overactivity and normal emptying, no evidence of obstruction  -Negative cystoscopy 5/13/2024      we discussed botox vs sacral neuromodulation: both have similar efficacy 80% patients reports >50% improvement, botox associated with 5% risk of incomplete emptying, increase in UTI and will require re-injection in 6-9 months; and as early as 3 months. SNM is a staged procedure, 2 weeks apart, consisting first of lead implantation then internalization of IPG if there is improvement. Interstim is " associated with lead migration, explantation, infection and bleeding, though risks are all <5%. We also discussed PTNS which is associated with success rates comparable to medical therapy but without side-effects without significant major morbidity.       Also discussed the blueprint device, she is most interested in this, I gave her informational handouts and she will be prescreened for the Revi trial    Follow up in 6 weeks     All questions and concerns were answered and addressed.  The patient expressed understanding and agrees with the plan.     Michael Campos MD    Scribe Attestation  By signing my name below, I, Graciela De La Garza, Scribe   attest that this documentation has been prepared under the direction and in the presence of Michael Campos MD.

## 2024-05-15 ENCOUNTER — TREATMENT (OUTPATIENT)
Dept: PHYSICAL THERAPY | Facility: CLINIC | Age: 64
End: 2024-05-15
Payer: COMMERCIAL

## 2024-05-15 DIAGNOSIS — M75.100 ROTATOR CUFF TEAR: ICD-10-CM

## 2024-05-15 PROCEDURE — 97110 THERAPEUTIC EXERCISES: CPT | Mod: GP,CQ

## 2024-05-15 PROCEDURE — 97140 MANUAL THERAPY 1/> REGIONS: CPT | Mod: GP,CQ

## 2024-05-15 ASSESSMENT — PAIN - FUNCTIONAL ASSESSMENT: PAIN_FUNCTIONAL_ASSESSMENT: 0-10

## 2024-05-15 ASSESSMENT — PAIN SCALES - GENERAL: PAINLEVEL_OUTOF10: 0 - NO PAIN

## 2024-05-15 NOTE — PROGRESS NOTES
Physical Therapy Treatment    Patient Name: Melany Garcia  MRN: [id number]  Today's Date: 5/15/2024  Time Calculation  Start Time: 1200  Stop Time: 1240  Time Calculation (min): 40 min  PT Therapeutic Procedures Time Entry  Manual Therapy Time Entry: 17  Therapeutic Exercise Time Entry: 23,      Current Problem  1. Rotator cuff tear  Follow Up In Physical Therapy            Insurance:  Payor:  EMPLOYEE MEDICAL PLAN / Plan:  EMPLOYEE MEDICAL PLAN HEALTHY CHOICE / Product Type: *No Product type* /   Number of Treatments Authorized: 17 of 30          Subjective   General  Reason for Referral: s/p R RCR (3/11/24)  Referred By: Romulo Guillen DO  Past Medical History Relevant to Rehab: Hx of diabetes, HTN, RA, Liver disease, Anemia  General Comment: PT STATES HER SHOULDER IS DOING ALRIGHT TODAY.    Performing HEP?: Yes    Precautions  Precautions  Precautions Comment: None  Pain  Pain Assessment: 0-10  Pain Score: 0 - No pain  Pain Location: Shoulder  Pain Orientation: Right    Objective   General Observation  General Observation: FWD POSTURE       Treatments:    Therapeutic Exercise  Therapeutic Exercise Activity 1: SciFit: L3: fwd/back x3' each  Therapeutic Exercise Activity 2: PEC STRETCH LOW - MID X 1 MIN EACH  Therapeutic Exercise Activity 3: WALL SLIDES SH FLEX X 2 MIN  Therapeutic Exercise Activity 4: WALL SLIDES ABC'S  Therapeutic Exercise Activity 5: MATRIX SH EXT 7.5# X 1 MIN  Therapeutic Exercise Activity 6: MATRIX ROW 7.5# X 1 MIN  Therapeutic Exercise Activity 7: MATRIX H ABD 2.5#  X 1 MIN  Therapeutic Exercise Activity 8: SL ER 2# 2 X 10  Therapeutic Exercise Activity 9: SPO 4# X 1 MIN  Therapeutic Exercise Activity 10: SUITCASE CARRY 15# KB 4 X 40'         Manual Therapy  Manual Therapy Activity 1: MFR R ARM PULL  Manual Therapy Activity 2: STM R PEC, BICEP, UT, LEV SCAP, TERES  Manual Therapy Activity 3: PROM R SH FLEX, ER, IR                   OP EDUCATION:  Outpatient Education  Education  Comment: CONTINUE WITH CURRENT HEP    Assessment:  PT Assessment  Assessment Comment: PT MIGUELITO EX'S WELL.  NOTED TIGHTNESS AND TENDERNESS IN HER R BICEP.  PT CONTINUES TO PROGRESS TOWARDS GOALS.    Plan:  OP PT Plan  PT Plan: Skilled PT (Continue with current POC. Progress strength and AROM as tolerated.)  PT Frequency: 2 times per week  Duration: 10 weeks  Onset Date: 03/11/24  Number of Treatments Authorized: 17 of 30  Rehab Potential: Good  Plan of Care Agreement: Patient    Goals:  Active       PT Problem       Patient to score 10% or less on QuickDASH in order to show improvement in overall quality of life. (Progressing)       Start:  03/20/24    Expected End:  05/29/24            Patient to achieve 160 degrees of right shoulder flexion and abduction AROM in order to be able to reach overhead. (Progressing)       Start:  03/20/24    Expected End:  05/29/24            Patient to achieve 4+/5 strength in right shoulder globally in order to perform daily tasks without difficulty. (Progressing)       Start:  03/20/24    Expected End:  05/29/24            Patient to demonstrate independence with HEP in order to establish self management of post-op recovery.  (Progressing)       Start:  03/20/24    Expected End:  05/29/24                 Burke Morris, PTA   Ambulance

## 2024-05-20 ENCOUNTER — TREATMENT (OUTPATIENT)
Dept: PHYSICAL THERAPY | Facility: CLINIC | Age: 64
End: 2024-05-20
Payer: COMMERCIAL

## 2024-05-20 DIAGNOSIS — M75.100 ROTATOR CUFF TEAR: ICD-10-CM

## 2024-05-20 PROCEDURE — 97140 MANUAL THERAPY 1/> REGIONS: CPT | Mod: GP,CQ

## 2024-05-20 PROCEDURE — 97110 THERAPEUTIC EXERCISES: CPT | Mod: GP,CQ

## 2024-05-20 ASSESSMENT — PAIN - FUNCTIONAL ASSESSMENT: PAIN_FUNCTIONAL_ASSESSMENT: 0-10

## 2024-05-20 ASSESSMENT — PAIN SCALES - GENERAL: PAINLEVEL_OUTOF10: 0 - NO PAIN

## 2024-05-20 NOTE — PROGRESS NOTES
Physical Therapy Treatment    Patient Name: Melany Garcia  MRN: [id number]  Today's Date: 5/20/2024  Time Calculation  Start Time: 1302  Stop Time: 1345  Time Calculation (min): 43 min  PT Therapeutic Procedures Time Entry  Manual Therapy Time Entry: 18  Therapeutic Exercise Time Entry: 25,      Current Problem  1. Rotator cuff tear  Follow Up In Physical Therapy            Insurance:  Payor:  EMPLOYEE MEDICAL PLAN / Plan:  EMPLOYEE MEDICAL PLAN HEALTHY CHOICE / Product Type: *No Product type* /   Number of Treatments Authorized: 18 of 30          Subjective   General  Reason for Referral: s/p R RCR (3/11/24)  Referred By: Romulo Guillen DO  Past Medical History Relevant to Rehab: Hx of diabetes, HTN, RA, Liver disease, Anemia  General Comment: PT STATES HER SHOULDER CONTINUES TO FEEL BETTER.    Performing HEP?: Yes    Precautions  Precautions  Precautions Comment: None  Pain  Pain Assessment: 0-10  Pain Score: 0 - No pain  Pain Location: Shoulder  Pain Orientation: Right    Objective   General Observation  General Observation: FWD POSTURE       Treatments:    Therapeutic Exercise  Therapeutic Exercise Activity 1: SciFit: L3: fwd/back x3' each  Therapeutic Exercise Activity 2: PEC STRETCH LOW - MID X 1 MIN EACH  Therapeutic Exercise Activity 3: STANDING SH FLEX STRETCH OH X 2 MIN  Therapeutic Exercise Activity 4: WALL TAPS ABC's 1#  Therapeutic Exercise Activity 5: MATRIX SH EXT 7.5# X 1 MIN  Therapeutic Exercise Activity 6: MATRIX ROW 7.5# X 1 MIN  Therapeutic Exercise Activity 7: MATRIX H ABD 2.5#  X 1 MIN  Therapeutic Exercise Activity 8: MATRIX ER 2.5# X 1 MIN  Therapeutic Exercise Activity 9: MATRIX PUNCH OUTS 10# X 1 MIN  Therapeutic Exercise Activity 10: SUITCASE CARRY 15# KB 2 X 80'  Therapeutic Exercise Activity 11: BODY BLADE SIDE, FRONT, SCAP X 1 MIN EACH  Therapeutic Exercise Activity 12: SH FLEX 1# X 1 MIN  Therapeutic Exercise Activity 13: WALL PUSH UPS X 20         Manual Therapy  Manual  Therapy Activity 1: MFR R ARM PULL  Manual Therapy Activity 2: STM R PEC, BICEP, UT, LEV SCAP, TERES  Manual Therapy Activity 3: PROM R SH FLEX, ER, IR                   OP EDUCATION:  Outpatient Education  Education Comment: CONTINUE WITH CURRENT HEP    Assessment:  PT Assessment  Assessment Comment: PT CONTINUES TO PROGRESS WITH HER STRENGTH.  SHE IS STILL TIGHT IN HER R BICEP AND PEC.  LACKS END ROM IN ALL ROM.  PT PROGRESSING TOWARDS GOALS.    Plan:  OP PT Plan  PT Plan: Skilled PT (Continue with current POC. Progress strength and AROM as tolerated.)  PT Frequency: 2 times per week  Duration: 10 weeks  Onset Date: 03/11/24  Number of Treatments Authorized: 18 of 30  Rehab Potential: Good  Plan of Care Agreement: Patient    Goals:  Active       PT Problem       Patient to score 10% or less on QuickDASH in order to show improvement in overall quality of life. (Progressing)       Start:  03/20/24    Expected End:  05/29/24            Patient to achieve 160 degrees of right shoulder flexion and abduction AROM in order to be able to reach overhead. (Progressing)       Start:  03/20/24    Expected End:  05/29/24            Patient to achieve 4+/5 strength in right shoulder globally in order to perform daily tasks without difficulty. (Progressing)       Start:  03/20/24    Expected End:  05/29/24            Patient to demonstrate independence with HEP in order to establish self management of post-op recovery.  (Progressing)       Start:  03/20/24    Expected End:  05/29/24                 Burke Morris, PTA

## 2024-05-22 ENCOUNTER — APPOINTMENT (OUTPATIENT)
Dept: PHYSICAL THERAPY | Facility: CLINIC | Age: 64
End: 2024-05-22
Payer: COMMERCIAL

## 2024-05-28 ENCOUNTER — TREATMENT (OUTPATIENT)
Dept: PHYSICAL THERAPY | Facility: CLINIC | Age: 64
End: 2024-05-28
Payer: COMMERCIAL

## 2024-05-28 DIAGNOSIS — M75.100 ROTATOR CUFF TEAR: ICD-10-CM

## 2024-05-28 PROCEDURE — 97110 THERAPEUTIC EXERCISES: CPT | Mod: GP | Performed by: PHYSICAL MEDICINE & REHABILITATION

## 2024-05-28 ASSESSMENT — PAIN SCALES - GENERAL: PAINLEVEL_OUTOF10: 0 - NO PAIN

## 2024-05-28 ASSESSMENT — PAIN - FUNCTIONAL ASSESSMENT: PAIN_FUNCTIONAL_ASSESSMENT: 0-10

## 2024-05-28 NOTE — PROGRESS NOTES
Physical Therapy Treatment    Patient Name: Melany Garcia  MRN: [id number]  Today's Date: 5/28/2024  Time Calculation  Start Time: 1305  Stop Time: 1345  Time Calculation (min): 40 min  PT Therapeutic Procedures Time Entry  Therapeutic Exercise Time Entry: 40,      Current Problem  1. Rotator cuff tear  Follow Up In Physical Therapy          Insurance:  Number of Treatments Authorized: 19 of 30          Subjective   General  Reason for Referral: s/p R RCR (3/11/24)  Referred By: Romulo Guillen DO  Past Medical History Relevant to Rehab: Hx of diabetes, HTN, RA, Liver disease, Anemia  General Comment: Patient reports to be doing very well this visit. She notes that she has not had any issues over these past few weeks.    Performing HEP?: Yes    Precautions  Precautions  Precautions Comment: None  Pain  Pain Assessment: 0-10  Pain Score: 0 - No pain  Pain Location: Shoulder  Pain Orientation: Right    Objective   Outcome Measures:  Other Measures  Disability of Arm Shoulder Hand (DASH): 20.45    Treatments:    Therapeutic Exercise  Therapeutic Exercise Activity 1: SciFit: L3: fwd/back x3' each  Therapeutic Exercise Activity 2: PEC STRETCH LOW - MID X 1 MIN EACH  Therapeutic Exercise Activity 3: Supine Shoulder Flexion: x20 w 9# bar  Therapeutic Exercise Activity 4: Serratus Punch: x20 w 6#  Therapeutic Exercise Activity 5: Sidelying ER: x20 w 4#  Therapeutic Exercise Activity 6: Prone Rows: x20 w 6#  Therapeutic Exercise Activity 7: Prone Ext: x20 w 4#  Therapeutic Exercise Activity 8: Prone I's, Y's and T's: 2x10 each w 1#    Assessment:  PT Assessment  Assessment Comment: Today's visit focused on progressing activities to improve patient's right shoulder strength, AROM and functional ability. Patient demonstrated good effort toward today's progressions. No increased pain reported at the conclusion of the session. Overall response toward today's interventions was great. Patient has demonstrated adequate  progress to begin working on her HEP independently following today's visit.    Plan:  OP PT Plan  PT Plan: Skilled PT (Continue with current POC. Progress strength and AROM as tolerated.)  PT Frequency: 2 times per week  Duration: 10 weeks  Onset Date: 03/11/24  Number of Treatments Authorized: 19 of 30  Rehab Potential: Good  Plan of Care Agreement: Patient    Goals:  Active       PT Problem       Patient to score 10% or less on QuickDASH in order to show improvement in overall quality of life. (Progressing)       Start:  03/20/24    Expected End:  05/29/24            Patient to achieve 160 degrees of right shoulder flexion and abduction AROM in order to be able to reach overhead. (Progressing)       Start:  03/20/24    Expected End:  05/29/24            Patient to achieve 4+/5 strength in right shoulder globally in order to perform daily tasks without difficulty. (Progressing)       Start:  03/20/24    Expected End:  05/29/24            Patient to demonstrate independence with HEP in order to establish self management of post-op recovery.  (Progressing)       Start:  03/20/24    Expected End:  05/29/24                 Pavan Maki, PT

## 2024-06-11 ENCOUNTER — TELEPHONE (OUTPATIENT)
Dept: PRIMARY CARE | Facility: CLINIC | Age: 64
End: 2024-06-11
Payer: COMMERCIAL

## 2024-06-11 DIAGNOSIS — R87.810 HIGH-RISK HUMAN PAPILLOMAVIRUS (HPV) DNA DETECTED IN CERVICAL SPECIMEN: ICD-10-CM

## 2024-06-18 ENCOUNTER — APPOINTMENT (OUTPATIENT)
Dept: PRIMARY CARE | Facility: CLINIC | Age: 64
End: 2024-06-18
Payer: COMMERCIAL

## 2024-06-20 ENCOUNTER — APPOINTMENT (OUTPATIENT)
Dept: UROLOGY | Facility: CLINIC | Age: 64
End: 2024-06-20
Payer: COMMERCIAL

## 2024-07-09 ENCOUNTER — APPOINTMENT (OUTPATIENT)
Dept: PRIMARY CARE | Facility: CLINIC | Age: 64
End: 2024-07-09
Payer: COMMERCIAL

## 2024-07-09 ENCOUNTER — TELEPHONE (OUTPATIENT)
Dept: PRIMARY CARE | Facility: CLINIC | Age: 64
End: 2024-07-09

## 2024-07-09 VITALS
OXYGEN SATURATION: 94 % | SYSTOLIC BLOOD PRESSURE: 104 MMHG | HEART RATE: 59 BPM | WEIGHT: 235 LBS | DIASTOLIC BLOOD PRESSURE: 70 MMHG | BODY MASS INDEX: 35.21 KG/M2

## 2024-07-09 DIAGNOSIS — K74.69 OTHER CIRRHOSIS OF LIVER (MULTI): ICD-10-CM

## 2024-07-09 DIAGNOSIS — F51.01 PRIMARY INSOMNIA: ICD-10-CM

## 2024-07-09 DIAGNOSIS — E55.9 VITAMIN D DEFICIENCY: Primary | ICD-10-CM

## 2024-07-09 DIAGNOSIS — K76.0 STEATOSIS OF LIVER: Primary | ICD-10-CM

## 2024-07-09 RX ORDER — HYDROXYZINE HYDROCHLORIDE 10 MG/1
10 TABLET, FILM COATED ORAL NIGHTLY
Qty: 30 TABLET | Refills: 1 | Status: SHIPPED | OUTPATIENT
Start: 2024-07-09 | End: 2024-09-07

## 2024-07-09 ASSESSMENT — PATIENT HEALTH QUESTIONNAIRE - PHQ9
1. LITTLE INTEREST OR PLEASURE IN DOING THINGS: NOT AT ALL
SUM OF ALL RESPONSES TO PHQ9 QUESTIONS 1 AND 2: 0
2. FEELING DOWN, DEPRESSED OR HOPELESS: NOT AT ALL

## 2024-07-09 ASSESSMENT — ENCOUNTER SYMPTOMS
INSOMNIA: 1
WEAKNESS: 0
NERVOUS/ANXIOUS: 1
BACK PAIN: 1
SLEEP DISTURBANCE: 1
ARTHRALGIAS: 1
FATIGUE: 1
MYALGIAS: 1

## 2024-07-09 ASSESSMENT — PAIN SCALES - GENERAL: PAINLEVEL: 0-NO PAIN

## 2024-07-09 NOTE — TELEPHONE ENCOUNTER
Patient would like labs placed for liver enzymes, vitamin D and all the regular labs added.    Patient can be reached at [phone]

## 2024-07-10 PROCEDURE — RXMED WILLOW AMBULATORY MEDICATION CHARGE

## 2024-07-11 ENCOUNTER — PHARMACY VISIT (OUTPATIENT)
Dept: PHARMACY | Facility: CLINIC | Age: 64
End: 2024-07-11
Payer: COMMERCIAL

## 2024-07-17 ENCOUNTER — TELEPHONE (OUTPATIENT)
Dept: PRIMARY CARE | Facility: CLINIC | Age: 64
End: 2024-07-17
Payer: COMMERCIAL

## 2024-07-17 NOTE — TELEPHONE ENCOUNTER
Tart cherry capsules , patient was not sure of the strength? Also wanted lipid panel, and A1C checked with her current BW order. She has not gotten BW done yet.

## 2024-07-18 DIAGNOSIS — E11.9 TYPE 2 DIABETES MELLITUS WITHOUT COMPLICATION, UNSPECIFIED WHETHER LONG TERM INSULIN USE (MULTI): ICD-10-CM

## 2024-07-18 DIAGNOSIS — E78.49 OTHER HYPERLIPIDEMIA: Primary | ICD-10-CM

## 2024-08-08 ENCOUNTER — TELEPHONE (OUTPATIENT)
Dept: PRIMARY CARE | Facility: CLINIC | Age: 64
End: 2024-08-08
Payer: COMMERCIAL

## 2024-08-08 DIAGNOSIS — N32.81 OVERACTIVE BLADDER: Primary | ICD-10-CM

## 2024-08-08 NOTE — TELEPHONE ENCOUNTER
Patient called  [phone] she was seen 7-9 Nadine said she had a kidney stone possible UTI, she would like a urine culture order she forgot to ask her

## 2024-08-17 ENCOUNTER — LAB REQUISITION (OUTPATIENT)
Dept: LAB | Facility: HOSPITAL | Age: 64
End: 2024-08-17
Payer: COMMERCIAL

## 2024-08-17 DIAGNOSIS — E55.9 VITAMIN D DEFICIENCY, UNSPECIFIED: ICD-10-CM

## 2024-08-17 DIAGNOSIS — K74.69 OTHER CIRRHOSIS OF LIVER (MULTI): ICD-10-CM

## 2024-08-17 DIAGNOSIS — E11.9 TYPE 2 DIABETES MELLITUS WITHOUT COMPLICATIONS (MULTI): ICD-10-CM

## 2024-08-17 DIAGNOSIS — E78.49 OTHER HYPERLIPIDEMIA: ICD-10-CM

## 2024-08-17 LAB
25(OH)D3 SERPL-MCNC: 68 NG/ML (ref 30–100)
ALBUMIN SERPL BCP-MCNC: 4.5 G/DL (ref 3.4–5)
ALP SERPL-CCNC: 58 U/L (ref 33–136)
ALT SERPL W P-5'-P-CCNC: 27 U/L (ref 7–45)
ANION GAP SERPL CALC-SCNC: 16 MMOL/L (ref 10–20)
AST SERPL W P-5'-P-CCNC: 27 U/L (ref 9–39)
BASOPHILS # BLD AUTO: 0.04 X10*3/UL (ref 0–0.1)
BASOPHILS NFR BLD AUTO: 0.6 %
BILIRUB SERPL-MCNC: 0.7 MG/DL (ref 0–1.2)
BUN SERPL-MCNC: 28 MG/DL (ref 6–23)
CALCIUM SERPL-MCNC: 9.1 MG/DL (ref 8.6–10.3)
CHLORIDE SERPL-SCNC: 104 MMOL/L (ref 98–107)
CHOLEST SERPL-MCNC: 210 MG/DL (ref 0–199)
CHOLESTEROL/HDL RATIO: 4.8
CO2 SERPL-SCNC: 24 MMOL/L (ref 21–32)
CREAT SERPL-MCNC: 0.97 MG/DL (ref 0.5–1.05)
EGFRCR SERPLBLD CKD-EPI 2021: 67 ML/MIN/1.73M*2
EOSINOPHIL # BLD AUTO: 0.49 X10*3/UL (ref 0–0.7)
EOSINOPHIL NFR BLD AUTO: 7.9 %
ERYTHROCYTE [DISTWIDTH] IN BLOOD BY AUTOMATED COUNT: 13.7 % (ref 11.5–14.5)
EST. AVERAGE GLUCOSE BLD GHB EST-MCNC: 128 MG/DL
GLUCOSE SERPL-MCNC: 119 MG/DL (ref 74–99)
HBA1C MFR BLD: 6.1 %
HCT VFR BLD AUTO: 38.9 % (ref 36–46)
HDLC SERPL-MCNC: 44.2 MG/DL
HGB BLD-MCNC: 12.9 G/DL (ref 12–16)
IMM GRANULOCYTES # BLD AUTO: 0.04 X10*3/UL (ref 0–0.7)
IMM GRANULOCYTES NFR BLD AUTO: 0.6 % (ref 0–0.9)
LDLC SERPL CALC-MCNC: 134 MG/DL
LYMPHOCYTES # BLD AUTO: 1.35 X10*3/UL (ref 1.2–4.8)
LYMPHOCYTES NFR BLD AUTO: 21.7 %
MCH RBC QN AUTO: 30.4 PG (ref 26–34)
MCHC RBC AUTO-ENTMCNC: 33.2 G/DL (ref 32–36)
MCV RBC AUTO: 92 FL (ref 80–100)
MONOCYTES # BLD AUTO: 0.37 X10*3/UL (ref 0.1–1)
MONOCYTES NFR BLD AUTO: 6 %
NEUTROPHILS # BLD AUTO: 3.92 X10*3/UL (ref 1.2–7.7)
NEUTROPHILS NFR BLD AUTO: 63.2 %
NON HDL CHOLESTEROL: 166 MG/DL (ref 0–149)
NRBC BLD-RTO: 0 /100 WBCS (ref 0–0)
PLATELET # BLD AUTO: 159 X10*3/UL (ref 150–450)
POTASSIUM SERPL-SCNC: 4.2 MMOL/L (ref 3.5–5.3)
PROT SERPL-MCNC: 7.2 G/DL (ref 6.4–8.2)
RBC # BLD AUTO: 4.24 X10*6/UL (ref 4–5.2)
SODIUM SERPL-SCNC: 140 MMOL/L (ref 136–145)
TRIGL SERPL-MCNC: 159 MG/DL (ref 0–149)
VLDL: 32 MG/DL (ref 0–40)
WBC # BLD AUTO: 6.2 X10*3/UL (ref 4.4–11.3)

## 2024-08-17 PROCEDURE — 83036 HEMOGLOBIN GLYCOSYLATED A1C: CPT

## 2024-08-17 PROCEDURE — 80061 LIPID PANEL: CPT

## 2024-08-17 PROCEDURE — 85025 COMPLETE CBC W/AUTO DIFF WBC: CPT

## 2024-08-17 PROCEDURE — 82306 VITAMIN D 25 HYDROXY: CPT

## 2024-08-17 PROCEDURE — 80053 COMPREHEN METABOLIC PANEL: CPT

## 2024-08-20 PROCEDURE — RXMED WILLOW AMBULATORY MEDICATION CHARGE

## 2024-08-22 ENCOUNTER — PHARMACY VISIT (OUTPATIENT)
Dept: PHARMACY | Facility: CLINIC | Age: 64
End: 2024-08-22
Payer: COMMERCIAL

## 2024-08-23 ENCOUNTER — LAB (OUTPATIENT)
Dept: LAB | Facility: LAB | Age: 64
End: 2024-08-23
Payer: COMMERCIAL

## 2024-08-23 DIAGNOSIS — N32.81 OVERACTIVE BLADDER: ICD-10-CM

## 2024-08-23 LAB
APPEARANCE UR: CLEAR
BILIRUB UR STRIP.AUTO-MCNC: NEGATIVE MG/DL
COLOR UR: ABNORMAL
GLUCOSE UR STRIP.AUTO-MCNC: NORMAL MG/DL
KETONES UR STRIP.AUTO-MCNC: NEGATIVE MG/DL
LEUKOCYTE ESTERASE UR QL STRIP.AUTO: ABNORMAL
NITRITE UR QL STRIP.AUTO: NEGATIVE
PH UR STRIP.AUTO: 6.5 [PH]
PROT UR STRIP.AUTO-MCNC: NEGATIVE MG/DL
RBC # UR STRIP.AUTO: NEGATIVE /UL
RBC #/AREA URNS AUTO: ABNORMAL /HPF
SP GR UR STRIP.AUTO: 1.02
SQUAMOUS #/AREA URNS AUTO: ABNORMAL /HPF
UROBILINOGEN UR STRIP.AUTO-MCNC: NORMAL MG/DL
WBC #/AREA URNS AUTO: ABNORMAL /HPF

## 2024-09-17 PROCEDURE — RXMED WILLOW AMBULATORY MEDICATION CHARGE

## 2024-09-18 ENCOUNTER — PHARMACY VISIT (OUTPATIENT)
Dept: PHARMACY | Facility: CLINIC | Age: 64
End: 2024-09-18
Payer: COMMERCIAL

## 2024-10-15 ENCOUNTER — CLINICAL SUPPORT (OUTPATIENT)
Dept: SLEEP MEDICINE | Facility: CLINIC | Age: 64
End: 2024-10-15
Payer: COMMERCIAL

## 2024-10-15 DIAGNOSIS — G47.33 OBSTRUCTIVE SLEEP APNEA: ICD-10-CM

## 2024-10-15 ASSESSMENT — SLEEP AND FATIGUE QUESTIONNAIRES
ESS TOTAL SCORE: 20
HOW LIKELY ARE YOU TO NOD OFF OR FALL ASLEEP WHILE LYING DOWN TO REST IN THE AFTERNOON WHEN CIRCUMSTANCES PERMIT: HIGH CHANCE OF DOZING
HOW LIKELY ARE YOU TO NOD OFF OR FALL ASLEEP WHILE WATCHING TV: HIGH CHANCE OF DOZING
HOW LIKELY ARE YOU TO NOD OFF OR FALL ASLEEP WHEN YOU ARE A PASSENGER IN A CAR FOR AN HOUR WITHOUT A BREAK: HIGH CHANCE OF DOZING
HOW LIKELY ARE YOU TO NOD OFF OR FALL ASLEEP WHILE SITTING AND READING: HIGH CHANCE OF DOZING
HOW LIKELY ARE YOU TO NOD OFF OR FALL ASLEEP WHILE SITTING AND TALKING TO SOMEONE: SLIGHT CHANCE OF DOZING
HOW LIKELY ARE YOU TO NOD OFF OR FALL ASLEEP WHILE SITTING QUIETLY AFTER LUNCH WITHOUT ALCOHOL: MODERATE CHANCE OF DOZING
HOW LIKELY ARE YOU TO NOD OFF OR FALL ASLEEP IN A CAR, WHILE STOPPED FOR A FEW MINUTES IN TRAFFIC: MODERATE CHANCE OF DOZING
SITING INACTIVE IN A PUBLIC PLACE LIKE A CLASS ROOM OR A MOVIE THEATER: HIGH CHANCE OF DOZING
ESS-CHAD TOTAL SCORE: 20

## 2024-10-15 ASSESSMENT — PAIN SCALES - GENERAL: PAINLEVEL: 0-NO PAIN

## 2024-10-16 VITALS
DIASTOLIC BLOOD PRESSURE: 76 MMHG | HEART RATE: 69 BPM | SYSTOLIC BLOOD PRESSURE: 130 MMHG | OXYGEN SATURATION: 99 % | BODY MASS INDEX: 34.8 KG/M2 | HEIGHT: 69 IN | WEIGHT: 235 LBS | RESPIRATION RATE: 12 BRPM | TEMPERATURE: 97.5 F

## 2024-10-16 PROCEDURE — RXMED WILLOW AMBULATORY MEDICATION CHARGE

## 2024-10-16 NOTE — PROGRESS NOTES
[facility] TECH NOTE:     Patient: Melany Garcia   MRN//AGE: [id number]  1960  64 y.o.   Technologist: Kayla Jimenez   Room: 108   Service Date: 10/15/2024        Sleep Testing Location: [facility]:     TECHNOLOGIST SLEEP STUDY PROCEDURE NOTE:   This sleep study is being conducted according to the policies and procedures outlined by the AAS accreditation standards.  The sleep study procedure and processes involved during this appointment was explained to the patient/patient’s family, questions were answered. The patient/family verbalized understanding.      The patient is a 64 y.o. year old female scheduled for a CPAP titration. She arrived for her appointment.      The study that was ultimately completed was a CPAP titration.    The full study Was completed.  Patient questionnaires completed?: yes     Consents signed? yes    Initial Fall Risk Screening:     Melany has not fallen in the last 6 months. Melany does not have a fear of falling. She does not need assistance with sitting, standing, or walking. She does not need assistance walking in her home. she does not need assistance in an unfamiliar setting. The patient is not using an assistive device.     Brief Study observations: 68 year old female presents for a CPAP titration. CPAP titration was started out at 4 cmH20 using a ResMed AirTouch N20 nasal/large, with an ending pressure of 6 cmH2O. She seemed to have tolerated the pressure and mask fit well. Patient had a difficult time initiating sleep as well as maintaining sleep. She woke three times throughout the night to use the restroom. Moderate/intermittent snoring was observed. NSR was observed. REM was briefly observed.      Deviation to order/protocol and reason: none     If PAP, which was preferred mask/pressure/mode: ResMed AirTouch  N20 nasal/large    Other:None    After the procedure, the patient/family was informed to ensure followup with ordering clinician for testing results.       Technologist: Kayla Jimenez

## 2024-10-21 ENCOUNTER — PHARMACY VISIT (OUTPATIENT)
Dept: PHARMACY | Facility: CLINIC | Age: 64
End: 2024-10-21
Payer: COMMERCIAL

## 2024-10-22 NOTE — ADDENDUM NOTE
Addendum  created 10/22/24 1744 by Billy Whipple MD    Clinical Note Signed, Intraprocedure Blocks edited, SmartForm saved

## 2024-10-23 PROCEDURE — RXMED WILLOW AMBULATORY MEDICATION CHARGE

## 2024-10-24 ENCOUNTER — PHARMACY VISIT (OUTPATIENT)
Dept: PHARMACY | Facility: CLINIC | Age: 64
End: 2024-10-24
Payer: COMMERCIAL

## 2024-10-28 NOTE — RESULT ENCOUNTER NOTE
Her CPAP titration suggest best treatment of her apnea with AutoPap 5-10.  Please have her use this.  If she is not feeling better with better compliance after 4 to 6 weeks she should reach back out to me and we can pursue inspire starting with a drug-induced sleep endoscopy

## 2024-11-05 ENCOUNTER — APPOINTMENT (OUTPATIENT)
Dept: CARDIOLOGY | Facility: CLINIC | Age: 64
End: 2024-11-05
Payer: COMMERCIAL

## 2024-11-11 DIAGNOSIS — G47.33 OBSTRUCTIVE SLEEP APNEA: Primary | ICD-10-CM

## 2024-11-15 ENCOUNTER — TELEPHONE (OUTPATIENT)
Dept: PRIMARY CARE | Facility: CLINIC | Age: 64
End: 2024-11-15
Payer: COMMERCIAL

## 2024-11-18 DIAGNOSIS — F51.01 PRIMARY INSOMNIA: ICD-10-CM

## 2024-11-18 PROCEDURE — RXMED WILLOW AMBULATORY MEDICATION CHARGE

## 2024-11-19 ENCOUNTER — OFFICE VISIT (OUTPATIENT)
Dept: CARDIOLOGY | Facility: CLINIC | Age: 64
End: 2024-11-19
Payer: COMMERCIAL

## 2024-11-19 VITALS
WEIGHT: 253.7 LBS | BODY MASS INDEX: 38.01 KG/M2 | OXYGEN SATURATION: 94 % | HEART RATE: 67 BPM | SYSTOLIC BLOOD PRESSURE: 132 MMHG | DIASTOLIC BLOOD PRESSURE: 81 MMHG

## 2024-11-19 DIAGNOSIS — I48.91 ATRIAL FIBRILLATION, UNSPECIFIED TYPE (MULTI): ICD-10-CM

## 2024-11-19 DIAGNOSIS — E78.5 HYPERLIPIDEMIA, UNSPECIFIED HYPERLIPIDEMIA TYPE: ICD-10-CM

## 2024-11-19 DIAGNOSIS — I10 PRIMARY HYPERTENSION: Primary | ICD-10-CM

## 2024-11-19 DIAGNOSIS — R07.9 CHEST PAIN, UNSPECIFIED TYPE: ICD-10-CM

## 2024-11-19 LAB
ATRIAL RATE: 66 BPM
P AXIS: 51 DEGREES
P OFFSET: 195 MS
P ONSET: 146 MS
PR INTERVAL: 140 MS
Q ONSET: 216 MS
QRS COUNT: 11 BEATS
QRS DURATION: 96 MS
QT INTERVAL: 440 MS
QTC CALCULATION(BAZETT): 461 MS
QTC FREDERICIA: 454 MS
R AXIS: 57 DEGREES
T AXIS: 81 DEGREES
T OFFSET: 436 MS
VENTRICULAR RATE: 66 BPM

## 2024-11-19 PROCEDURE — RXMED WILLOW AMBULATORY MEDICATION CHARGE

## 2024-11-19 PROCEDURE — 1036F TOBACCO NON-USER: CPT | Performed by: NURSE PRACTITIONER

## 2024-11-19 PROCEDURE — 3044F HG A1C LEVEL LT 7.0%: CPT | Performed by: NURSE PRACTITIONER

## 2024-11-19 PROCEDURE — 3050F LDL-C >= 130 MG/DL: CPT | Performed by: NURSE PRACTITIONER

## 2024-11-19 PROCEDURE — 3079F DIAST BP 80-89 MM HG: CPT | Performed by: NURSE PRACTITIONER

## 2024-11-19 PROCEDURE — 3075F SYST BP GE 130 - 139MM HG: CPT | Performed by: NURSE PRACTITIONER

## 2024-11-19 PROCEDURE — 93005 ELECTROCARDIOGRAM TRACING: CPT | Performed by: NURSE PRACTITIONER

## 2024-11-19 PROCEDURE — 99214 OFFICE O/P EST MOD 30 MIN: CPT | Performed by: NURSE PRACTITIONER

## 2024-11-19 PROCEDURE — 4010F ACE/ARB THERAPY RXD/TAKEN: CPT | Performed by: NURSE PRACTITIONER

## 2024-11-19 PROCEDURE — 99204 OFFICE O/P NEW MOD 45 MIN: CPT | Performed by: NURSE PRACTITIONER

## 2024-11-19 RX ORDER — ROSUVASTATIN CALCIUM 5 MG/1
5 TABLET, COATED ORAL DAILY
Qty: 30 TABLET | Refills: 11 | Status: SHIPPED | OUTPATIENT
Start: 2024-11-19 | End: 2025-11-19

## 2024-11-19 RX ORDER — HYDROXYZINE HYDROCHLORIDE 10 MG/1
10 TABLET, FILM COATED ORAL NIGHTLY
Qty: 90 TABLET | Refills: 1 | Status: SHIPPED | OUTPATIENT
Start: 2024-11-19 | End: 2025-05-18

## 2024-11-19 RX ORDER — ASPIRIN 81 MG/1
81 TABLET ORAL DAILY
COMMUNITY

## 2024-11-19 ASSESSMENT — ENCOUNTER SYMPTOMS
GASTROINTESTINAL NEGATIVE: 1
DYSPNEA ON EXERTION: 1
PALPITATIONS: 1
CONSTITUTIONAL NEGATIVE: 1
HEMATOLOGIC/LYMPHATIC NEGATIVE: 1
NEUROLOGICAL NEGATIVE: 1
EYES NEGATIVE: 1
ENDOCRINE NEGATIVE: 1
PSYCHIATRIC NEGATIVE: 1
RESPIRATORY NEGATIVE: 1
MYALGIAS: 1

## 2024-11-19 NOTE — PATIENT INSTRUCTIONS
CALL WITH ANY QUESTIONS   START CRESTOR 5 MG DAILY   NUCLEAR STRESS TEST   HEART MONITOR FOR TWO WEEKS   WILL CALL TO REVIEW THE RESULTS

## 2024-11-19 NOTE — PROGRESS NOTES
"Referred by Dr. Almodovar for New Patient Visit (Establish care.  Previous cardiologist transferred out of .  States she had an unusual, \"new and weird\", feeling in both sides of her jaw the other night.)     History Of Present Illness:    Dear Nadine Holder PA-C,     I had the pleasure of meeting Mrs. Garcia today at [facility] to establish cardiac care. The patient is seen in collaboration with Dr. Iglesias. Mrs. Garcia is a very pleasant 64 year old female with a history of HLD, HTN, arthritis, DM, RALPH and atrial fibrillation, denies history of smoking. Over the past weekend had noticed jaw pain while working in the kitchen. She complains of exertional dyspnea. Denies chest pain, she does have intermittent heart palpitations.     Review of Systems   Constitutional: Negative.   HENT: Negative.     Eyes: Negative.    Cardiovascular:  Positive for dyspnea on exertion and palpitations.   Respiratory: Negative.     Endocrine: Negative.    Hematologic/Lymphatic: Negative.    Skin: Negative.    Musculoskeletal:  Positive for myalgias.   Gastrointestinal: Negative.    Neurological: Negative.    Psychiatric/Behavioral: Negative.          Past Medical History:  She has a past medical history of A-fib (Multi), Acute urinary tract infection (11/28/2023), Adverse reaction to drug (11/28/2023), Allergic reaction (11/28/2023), Anemia, Ankylosing spondylitis, Delayed emergence from general anesthesia, Diabetes mellitus (Multi), Fever (11/28/2023), Gout, History of blood transfusion, Hyperlipidemia, Hypertension, Memory deficit, OA (osteoarthritis), RALPH on CPAP, PONV (postoperative nausea and vomiting), Psoriatic arthritis (Multi), Rotator cuff tear (11/28/2023), Vitamin D deficiency, and Wears glasses.    Past Surgical History:  She has a past surgical history that includes Joint replacement (Bilateral); Hysterectomy; Rotator cuff repair (Left); Colonoscopy (2018); Knee arthroscopy w/ " meniscal repair (Right); Oophorectomy (Right); Hernia repair; Foot Tendon Surgery (Bilateral); Nasal septum surgery; and [place] tooth extraction.      Social History:  She reports that she has never smoked. She has never used smokeless tobacco. She reports that she does not drink alcohol and does not use drugs.    Family History:  Family History   Problem Relation Name Age of Onset    Breast cancer Sister          Allergies:  Amoxicillin, Articaine-epinephrine bitart, Benzocaine, and Epinephrine    Outpatient Medications:  Current Outpatient Medications   Medication Instructions    aspirin 81 mg, Daily    B complex-vitamin C-folic acid (Nephro-Kaela Rx) 1- mg-mg-mcg tablet 1 tablet, Daily with breakfast    cholecalciferol (vitamin D3) 2,000 Units, 2 times daily    clindamycin (Cleocin) 300 mg capsule Take 2 capsules by mouth 1 hour prior to procedure and 1 capsule by mouth 6 hours post procedure. save remainder for future appointments    EPINEPHrine (Epipen) 0.3 mg/0.3 mL injection syringe INJECT 1 SYRINGE INTRAMUSCULARLY AS NEEDED FOR SHORTNESS OF BREATH    hydrOXYzine HCL (ATARAX) 10 mg, oral, Nightly    losartan (COZAAR) 100 mg, oral, Daily    meloxicam (Mobic) 7.5 mg tablet Take 1 tablet by mouth daily    metoprolol succinate XL (TOPROL-XL) 25 mg, oral, Daily    multivitamin tablet 1 tablet, Daily    prochlorperazine (COMPAZINE) 10 mg, oral, Every 6 hours PRN    rosuvastatin (CRESTOR) 5 mg, oral, Daily        Last Recorded Vitals:  Vitals:    11/19/24 1350   BP: 132/81   Pulse: 67   SpO2: 94%   Weight: 115 kg (253 lb 11.2 oz)       Physical Exam:  Physical Exam  Vitals reviewed.   HENT:      Head: Normocephalic.      Nose: Nose normal.   Eyes:      Pupils: Pupils are equal, round, and reactive to light.   Cardiovascular:      Rate and Rhythm: Normal rate and regular rhythm.   Pulmonary:      Effort: Pulmonary effort is normal.      Breath sounds: Normal breath sounds.   Abdominal:      General: Abdomen is  flat.      Palpations: Abdomen is soft.   Musculoskeletal:         General: Normal range of motion.      Cervical back: Normal range of motion.   Skin:     General: Skin is warm and dry.   Neurological:      General: No focal deficit present.      Mental Status: He is alert and oriented to person, place, and time.   Psychiatric:         Mood and Affect: Mood normal.            Last Labs:  CBC -  Lab Results   Component Value Date    WBC 6.2 08/17/2024    HGB 12.9 08/17/2024    HCT 38.9 08/17/2024    MCV 92 08/17/2024     08/17/2024       CMP -  Lab Results   Component Value Date    CALCIUM 9.1 08/17/2024    PHOS CANCELED 04/26/2023    PROT 7.2 08/17/2024    ALBUMIN 4.5 08/17/2024    AST 27 08/17/2024    ALT 27 08/17/2024    ALKPHOS 58 08/17/2024    BILITOT 0.7 08/17/2024       LIPID PANEL -   Lab Results   Component Value Date    CHOL 210 (H) 08/17/2024    TRIG 159 (H) 08/17/2024    HDL 44.2 08/17/2024    CHHDL 4.8 08/17/2024    LDLF 127 (H) 05/26/2023    VLDL 32 08/17/2024    NHDL 166 (H) 08/17/2024       RENAL FUNCTION PANEL -   Lab Results   Component Value Date    GLUCOSE 119 (H) 08/17/2024     08/17/2024    K 4.2 08/17/2024     08/17/2024    CO2 24 08/17/2024    ANIONGAP 16 08/17/2024    BUN 28 (H) 08/17/2024    CREATININE 0.97 08/17/2024    GFRMALE CANCELED 04/26/2023    CALCIUM 9.1 08/17/2024    PHOS CANCELED 04/26/2023    ALBUMIN 4.5 08/17/2024        Lab Results   Component Value Date    BNP 77 02/15/2023    HGBA1C 6.1 (H) 08/17/2024    HGBA1C 6.0 11/28/2023       Last Cardiology Tests:  ECG:  EKG independently reviewed from today showed sinus rhythm heart rate 66 bpm     Echo:  Echocardiogram 4/25/2023  . Left ventricular systolic function is normal with a 60-65% estimated ejection fraction.  2. Spectral Doppler shows an impaired relaxation pattern of left ventricular diastolic filling.  3. RVSP within normal limits.  Ejection Fractions:  LVEF 60-65%  Cath:    Stress Test:    Cardiac  Imaging:      Assessment/Plan   Mrs. Garcia is  very pleasant 64 year old female with a history of DM, PAF, HTN, HLD and RALPH, she is here to establish cardiac care. She complains of intermittent jaw pain and exertional dyspnea. CT scan showed severe coronary artery calcification. She will be set up for a nuclear stress test, the dyspnea is an anginal equivalent. She complains of intermittent heart palpitations, has had PAF in the past not currently on OAC, she will have a monitor to assess for an arrhythmia. Lipid panel is not at goal, she will be started on Crestor 5 mg daily. Heart rate and blood pressure are well controlled today    Plan   -call with any questions   -start Crestor 5 mg daily   -nuclear exercise stress test   -heart monitor for two weeks   -will call to review  the results   -continue Aspirin 81 mg daily, Losartan 100 mg daily and Metoprolol ER 25 mg daily     I appreciate the opportunity to participate in the patient's care, please call with any questions         Rosa Oneil, APRN-CNP

## 2024-11-20 DIAGNOSIS — R30.0 DYSURIA: ICD-10-CM

## 2024-11-20 DIAGNOSIS — I10 ESSENTIAL HYPERTENSION: Primary | ICD-10-CM

## 2024-11-20 RX ORDER — LOSARTAN POTASSIUM 100 MG/1
100 TABLET ORAL DAILY
Qty: 90 TABLET | Refills: 1 | Status: SHIPPED | OUTPATIENT
Start: 2024-11-20

## 2024-11-20 RX ORDER — METOPROLOL SUCCINATE 25 MG/1
25 TABLET, EXTENDED RELEASE ORAL DAILY
Qty: 90 TABLET | Refills: 1 | Status: SHIPPED | OUTPATIENT
Start: 2024-11-20

## 2024-11-21 ENCOUNTER — CLINICAL SUPPORT (OUTPATIENT)
Dept: SLEEP MEDICINE | Facility: HOSPITAL | Age: 64
End: 2024-11-21
Payer: COMMERCIAL

## 2024-11-21 DIAGNOSIS — G47.33 OBSTRUCTIVE SLEEP APNEA: ICD-10-CM

## 2024-11-21 PROCEDURE — 95806 SLEEP STUDY UNATT&RESP EFFT: CPT | Performed by: STUDENT IN AN ORGANIZED HEALTH CARE EDUCATION/TRAINING PROGRAM

## 2024-11-22 ENCOUNTER — PHARMACY VISIT (OUTPATIENT)
Dept: PHARMACY | Facility: CLINIC | Age: 64
End: 2024-11-22
Payer: COMMERCIAL

## 2024-12-02 ENCOUNTER — TELEPHONE (OUTPATIENT)
Dept: UROLOGY | Facility: CLINIC | Age: 64
End: 2024-12-02
Payer: COMMERCIAL

## 2024-12-03 NOTE — RESULT ENCOUNTER NOTE
Please call the patient regarding her HSAT.  She has an AHI 3% of 10.1 and AHI 4% of 5.0.  Oxygen rajeev of 80%.  She has relatively mild obstructive sleep apnea that may not fully account for her tiredness.  She does not qualify for inspire.  I would like her to see Dr. Weber for further sleep evaluation.  Consider a dental appliance for snoring and her mild sleep apnea.

## 2024-12-04 ENCOUNTER — OFFICE VISIT (OUTPATIENT)
Dept: OBSTETRICS AND GYNECOLOGY | Facility: CLINIC | Age: 64
End: 2024-12-04
Payer: COMMERCIAL

## 2024-12-04 VITALS
HEIGHT: 69 IN | SYSTOLIC BLOOD PRESSURE: 117 MMHG | WEIGHT: 255 LBS | DIASTOLIC BLOOD PRESSURE: 69 MMHG | BODY MASS INDEX: 37.77 KG/M2

## 2024-12-04 DIAGNOSIS — Z01.419 ENCOUNTER FOR ANNUAL ROUTINE GYNECOLOGICAL EXAMINATION: Primary | ICD-10-CM

## 2024-12-04 DIAGNOSIS — Z87.42 HISTORY OF ABNORMAL CERVICAL PAP SMEAR: ICD-10-CM

## 2024-12-04 DIAGNOSIS — Z11.51 SCREENING FOR HUMAN PAPILLOMAVIRUS (HPV): ICD-10-CM

## 2024-12-04 DIAGNOSIS — R92.8 ABNORMAL FINDINGS ON DIAGNOSTIC IMAGING OF BREAST: ICD-10-CM

## 2024-12-04 DIAGNOSIS — N89.8 VAGINAL IRRITATION: ICD-10-CM

## 2024-12-04 PROCEDURE — 3050F LDL-C >= 130 MG/DL: CPT

## 2024-12-04 PROCEDURE — 99386 PREV VISIT NEW AGE 40-64: CPT

## 2024-12-04 PROCEDURE — 3044F HG A1C LEVEL LT 7.0%: CPT

## 2024-12-04 PROCEDURE — 3078F DIAST BP <80 MM HG: CPT

## 2024-12-04 PROCEDURE — 1036F TOBACCO NON-USER: CPT

## 2024-12-04 PROCEDURE — 4010F ACE/ARB THERAPY RXD/TAKEN: CPT

## 2024-12-04 PROCEDURE — 3074F SYST BP LT 130 MM HG: CPT

## 2024-12-04 PROCEDURE — 3008F BODY MASS INDEX DOCD: CPT

## 2024-12-04 PROCEDURE — RXMED WILLOW AMBULATORY MEDICATION CHARGE

## 2024-12-04 PROCEDURE — 87205 SMEAR GRAM STAIN: CPT

## 2024-12-04 RX ORDER — CLOTRIMAZOLE AND BETAMETHASONE DIPROPIONATE 10; .64 MG/G; MG/G
1 CREAM TOPICAL 2 TIMES DAILY PRN
Qty: 15 G | Refills: 3 | Status: SHIPPED | OUTPATIENT
Start: 2024-12-04 | End: 2025-12-04

## 2024-12-04 ASSESSMENT — ENCOUNTER SYMPTOMS
NAUSEA: 0
COUGH: 0
DYSURIA: 0
COLOR CHANGE: 0
UNEXPECTED WEIGHT CHANGE: 0
HEADACHES: 0
FATIGUE: 0
CHILLS: 0
FEVER: 0
VOMITING: 0
SHORTNESS OF BREATH: 0
ABDOMINAL PAIN: 0
DIZZINESS: 0

## 2024-12-04 ASSESSMENT — LIFESTYLE VARIABLES
HOW OFTEN DO YOU HAVE SIX OR MORE DRINKS ON ONE OCCASION: NEVER
HOW OFTEN DO YOU HAVE A DRINK CONTAINING ALCOHOL: NEVER
SKIP TO QUESTIONS 9-10: 1
HOW MANY STANDARD DRINKS CONTAINING ALCOHOL DO YOU HAVE ON A TYPICAL DAY: PATIENT DOES NOT DRINK
AUDIT-C TOTAL SCORE: 0

## 2024-12-04 ASSESSMENT — PAIN SCALES - GENERAL: PAINLEVEL_OUTOF10: 0-NO PAIN

## 2024-12-04 ASSESSMENT — PATIENT HEALTH QUESTIONNAIRE - PHQ9
2. FEELING DOWN, DEPRESSED OR HOPELESS: NOT AT ALL
1. LITTLE INTEREST OR PLEASURE IN DOING THINGS: NOT AT ALL
SUM OF ALL RESPONSES TO PHQ9 QUESTIONS 1 & 2: 0

## 2024-12-04 NOTE — ADDENDUM NOTE
Addendum  created 12/04/24 1636 by Billy Whipple MD    Clinical Note Signed, Intraprocedure Blocks edited, SmartForm saved

## 2024-12-04 NOTE — ADDENDUM NOTE
Addendum  created 12/04/24 1635 by Billy Whipple MD    Clinical Note Signed, Intraprocedure Blocks edited

## 2024-12-04 NOTE — PROGRESS NOTES
"Subjective   Melany Garcia is a 64 y.o. female who is here for a routine GYN exam; last saw Dr. Spivey 2021.  -Hx of supracervical hyst / right oophorectomy in .   -Denies pelvic pain, pressure, or bloating.  -Denies breast changes or concerns.   -Intermittent vaginal itching; denies abnml discharge, odor, or burning. Previously used Lotrisone cream through Dr. Spivey which she is requesting refills of.   -Sees Dr. Michael Campos urology as well.     Complaints:   see hpi  HRT: no   History of abnormal Pap smear: yes; since    - Last pap 2020 LGSIL, pos HPV (other)   - Colpo 2021 without cervical bx, ECC benign  History of abnormal mammogram: yes      OB History          5    Para   3    Term   3            AB   2    Living   3         SAB   2    IAB        Ectopic        Multiple        Live Births   3                  Review of Systems   Constitutional:  Negative for chills, fatigue, fever and unexpected weight change.   Respiratory:  Negative for cough and shortness of breath.    Gastrointestinal:  Negative for abdominal pain, nausea and vomiting.   Genitourinary:  Negative for dyspareunia, dysuria, pelvic pain and vaginal discharge.        + vaginal irritation   Skin:  Negative for color change and rash.   Neurological:  Negative for dizziness and headaches.       Objective   /69   Ht 1.74 m (5' 8.5\")   Wt 116 kg (255 lb)   BMI 38.21 kg/m²        General:   Alert and oriented, in no acute distress   Neck: Supple. No visible thyromegaly.    Breast/Axilla: Normal to palpation bilaterally without masses, skin changes, or nipple discharge.    Abdomen: Soft, non-tender, without masses or organomegaly   Vulva: Normal architecture without erythema, masses, or lesions.    Vagina: Normal mucosa without lesions, masses, or atrophy. No abnormal vaginal discharge.    Cervix: Normal without masses, lesions, or signs of cervicitis; pap performed    Uterus: Surgically absent    Adnexa: LEFT " non-tender; RIGHT absent    Pelvic Floor Normal    Psych Normal affect. Normal mood.      Assessment/Plan   Diagnoses and all orders for this visit:  Encounter for annual routine gynecological examination  -     THINPREP PAP TEST  - Pt unsure of last colonoscopy; provided GI provider recommendations.  Screening for human papillomavirus (HPV)  -     THINPREP PAP TEST  History of abnormal cervical Pap smear   - Since 2013   - Last pap 12/2020 LGSIL, pos HPV (other)   - Colpo 1/2021 without cervical bx, ECC benign  Abnormal findings on diagnostic imaging of breast  -     BI mammo bilateral diagnostic tomosynthesis; Future  -     BI US breast limited left; Future  - Last set of imaging screening juan c 1/2024 followed by LEFT dx imaging; overdue for 6 month follow up imaging; due next month for bilateral imaging again.   Vaginal irritation  -     Vaginitis Gram Stain For Bacterial Vaginosis + Yeast  -     clotrimazole-betamethasone (Lotrisone) cream; Apply 1 Application topically 2 times a day as needed (itching or irritation).      Mahogany Garza PA-C

## 2024-12-05 ENCOUNTER — PHARMACY VISIT (OUTPATIENT)
Dept: PHARMACY | Facility: CLINIC | Age: 64
End: 2024-12-05
Payer: COMMERCIAL

## 2024-12-05 ENCOUNTER — HOSPITAL ENCOUNTER (OUTPATIENT)
Dept: RADIOLOGY | Facility: HOSPITAL | Age: 64
Discharge: HOME | End: 2024-12-05
Payer: COMMERCIAL

## 2024-12-05 ENCOUNTER — HOSPITAL ENCOUNTER (OUTPATIENT)
Dept: CARDIOLOGY | Facility: HOSPITAL | Age: 64
Discharge: HOME | End: 2024-12-05
Payer: COMMERCIAL

## 2024-12-05 DIAGNOSIS — R07.9 CHEST PAIN, UNSPECIFIED TYPE: ICD-10-CM

## 2024-12-05 DIAGNOSIS — I48.91 ATRIAL FIBRILLATION, UNSPECIFIED TYPE (MULTI): ICD-10-CM

## 2024-12-05 LAB
BACTERIAL VAGINOSIS VAG-IMP: NORMAL
CLUE CELLS VAG LPF-#/AREA: NORMAL /[LPF]
NUGENT SCORE: 4
YEAST VAG WET PREP-#/AREA: NORMAL

## 2024-12-05 PROCEDURE — 93017 CV STRESS TEST TRACING ONLY: CPT

## 2024-12-05 PROCEDURE — 3430000001 HC RX 343 DIAGNOSTIC RADIOPHARMACEUTICALS: Performed by: NURSE PRACTITIONER

## 2024-12-05 PROCEDURE — 78452 HT MUSCLE IMAGE SPECT MULT: CPT

## 2024-12-05 PROCEDURE — 93246 EXT ECG>7D<15D RECORDING: CPT

## 2024-12-05 PROCEDURE — A9502 TC99M TETROFOSMIN: HCPCS | Performed by: NURSE PRACTITIONER

## 2024-12-05 PROCEDURE — 93016 CV STRESS TEST SUPVJ ONLY: CPT | Performed by: STUDENT IN AN ORGANIZED HEALTH CARE EDUCATION/TRAINING PROGRAM

## 2024-12-05 PROCEDURE — 93018 CV STRESS TEST I&R ONLY: CPT | Performed by: STUDENT IN AN ORGANIZED HEALTH CARE EDUCATION/TRAINING PROGRAM

## 2024-12-06 ENCOUNTER — TELEPHONE (OUTPATIENT)
Dept: CARDIOLOGY | Facility: HOSPITAL | Age: 64
End: 2024-12-06
Payer: COMMERCIAL

## 2024-12-06 DIAGNOSIS — R30.0 DYSURIA: ICD-10-CM

## 2024-12-06 NOTE — TELEPHONE ENCOUNTER
----- Message from Rosa Almodovar sent at 12/6/2024  9:21 AM EST -----  Please call and let him know his stress test is normal   Thanks  ----- Message -----  From: Interface, Radiology Results In  Sent: 12/5/2024  11:39 AM EST  To: Rosa Almodovar, APRN-CNP

## 2024-12-18 PROCEDURE — RXMED WILLOW AMBULATORY MEDICATION CHARGE

## 2024-12-18 RX ORDER — MELOXICAM 7.5 MG/1
TABLET ORAL
Qty: 30 TABLET | Refills: 2 | Status: CANCELLED | OUTPATIENT
Start: 2024-12-18

## 2024-12-19 ENCOUNTER — PHARMACY VISIT (OUTPATIENT)
Dept: PHARMACY | Facility: CLINIC | Age: 64
End: 2024-12-19
Payer: COMMERCIAL

## 2024-12-20 LAB

## 2024-12-26 ENCOUNTER — TELEPHONE (OUTPATIENT)
Dept: CARDIOLOGY | Facility: HOSPITAL | Age: 64
End: 2024-12-26
Payer: COMMERCIAL

## 2024-12-31 ENCOUNTER — PHARMACY VISIT (OUTPATIENT)
Dept: PHARMACY | Facility: CLINIC | Age: 64
End: 2024-12-31
Payer: COMMERCIAL

## 2024-12-31 PROCEDURE — RXMED WILLOW AMBULATORY MEDICATION CHARGE

## 2025-01-02 ENCOUNTER — SPECIALTY PHARMACY (OUTPATIENT)
Dept: PHARMACY | Facility: CLINIC | Age: 65
End: 2025-01-02

## 2025-01-02 DIAGNOSIS — N32.81 OAB (OVERACTIVE BLADDER): Primary | ICD-10-CM

## 2025-01-02 PROCEDURE — RXMED WILLOW AMBULATORY MEDICATION CHARGE

## 2025-01-15 ENCOUNTER — HOSPITAL ENCOUNTER (OUTPATIENT)
Dept: RADIOLOGY | Facility: HOSPITAL | Age: 65
Discharge: HOME | End: 2025-01-15
Payer: COMMERCIAL

## 2025-01-15 DIAGNOSIS — R92.8 ABNORMAL FINDINGS ON DIAGNOSTIC IMAGING OF BREAST: ICD-10-CM

## 2025-01-15 PROCEDURE — 76642 ULTRASOUND BREAST LIMITED: CPT | Mod: LT

## 2025-01-15 PROCEDURE — 77062 BREAST TOMOSYNTHESIS BI: CPT

## 2025-01-15 PROCEDURE — 76982 USE 1ST TARGET LESION: CPT | Mod: LT

## 2025-01-17 DIAGNOSIS — R92.8 ABNORMAL FINDINGS ON DIAGNOSTIC IMAGING OF BREAST: Primary | ICD-10-CM

## 2025-01-22 ENCOUNTER — PHARMACY VISIT (OUTPATIENT)
Dept: PHARMACY | Facility: CLINIC | Age: 65
End: 2025-01-22
Payer: COMMERCIAL

## 2025-01-25 NOTE — PROGRESS NOTES
Colposcopy    Date/Time: 1/28/2025 5:22 PM    Performed by: Tevin Whipple MD  Authorized by: Tevin Whipple MD    Procedure location: cervix    Indication:     Cervical indication(s): normal Pap smear and high-risk HPV positive

## 2025-01-27 PROCEDURE — RXMED WILLOW AMBULATORY MEDICATION CHARGE

## 2025-01-28 ENCOUNTER — APPOINTMENT (OUTPATIENT)
Dept: OBSTETRICS AND GYNECOLOGY | Facility: CLINIC | Age: 65
End: 2025-01-28
Payer: COMMERCIAL

## 2025-01-28 ENCOUNTER — PHARMACY VISIT (OUTPATIENT)
Dept: PHARMACY | Facility: CLINIC | Age: 65
End: 2025-01-28
Payer: COMMERCIAL

## 2025-01-28 VITALS — WEIGHT: 253.2 LBS | DIASTOLIC BLOOD PRESSURE: 88 MMHG | BODY MASS INDEX: 37.94 KG/M2 | SYSTOLIC BLOOD PRESSURE: 138 MMHG

## 2025-01-28 DIAGNOSIS — R87.618 PAP SMEAR ABNORMALITY OF CERVIX/HUMAN PAPILLOMAVIRUS (HPV) POSITIVE: Primary | ICD-10-CM

## 2025-01-28 PROCEDURE — 57456 ENDOCERV CURETTAGE W/SCOPE: CPT | Performed by: STUDENT IN AN ORGANIZED HEALTH CARE EDUCATION/TRAINING PROGRAM

## 2025-01-28 PROCEDURE — 57452 EXAM OF CERVIX W/SCOPE: CPT | Performed by: STUDENT IN AN ORGANIZED HEALTH CARE EDUCATION/TRAINING PROGRAM

## 2025-01-28 PROCEDURE — 57505 ENDOCERVICAL CURETTAGE: CPT | Performed by: STUDENT IN AN ORGANIZED HEALTH CARE EDUCATION/TRAINING PROGRAM

## 2025-01-28 ASSESSMENT — ENCOUNTER SYMPTOMS
OCCASIONAL FEELINGS OF UNSTEADINESS: 0
DEPRESSION: 0
LOSS OF SENSATION IN FEET: 0

## 2025-01-28 ASSESSMENT — PAIN SCALES - GENERAL: PAINLEVEL_OUTOF10: 0-NO PAIN

## 2025-01-30 ENCOUNTER — TELEPHONE (OUTPATIENT)
Dept: CARDIOLOGY | Facility: HOSPITAL | Age: 65
End: 2025-01-30

## 2025-01-30 ENCOUNTER — OFFICE VISIT (OUTPATIENT)
Dept: CARDIOLOGY | Facility: HOSPITAL | Age: 65
End: 2025-01-30
Payer: COMMERCIAL

## 2025-01-30 VITALS
OXYGEN SATURATION: 95 % | SYSTOLIC BLOOD PRESSURE: 130 MMHG | HEART RATE: 61 BPM | DIASTOLIC BLOOD PRESSURE: 88 MMHG | BODY MASS INDEX: 37.74 KG/M2 | WEIGHT: 251.88 LBS

## 2025-01-30 DIAGNOSIS — R00.2 PALPITATIONS: Primary | ICD-10-CM

## 2025-01-30 DIAGNOSIS — I47.10 NONSUSTAINED PAROXYSMAL SUPRAVENTRICULAR TACHYCARDIA (CMS-HCC): ICD-10-CM

## 2025-01-30 DIAGNOSIS — I48.91 RAPID ATRIAL FIBRILLATION (MULTI): ICD-10-CM

## 2025-01-30 PROCEDURE — 99214 OFFICE O/P EST MOD 30 MIN: CPT | Performed by: STUDENT IN AN ORGANIZED HEALTH CARE EDUCATION/TRAINING PROGRAM

## 2025-01-30 PROCEDURE — RXMED WILLOW AMBULATORY MEDICATION CHARGE

## 2025-01-30 NOTE — TELEPHONE ENCOUNTER
Onofre Rushing, we can try Multaq, I can send in a prescription. She can stop if any side effects. Thank you.

## 2025-01-30 NOTE — PROGRESS NOTES
Referred by Dr. Yee morgan. provider found for No chief complaint on file.     History Of Present Illness:    Melany Garcia is a 64 y.o. female with a history of HLD, HTN, arthritis, DM, RALPH and atrial fibrillation, denies history of smoking. Patient followed by Dr. Iglesias and Rosa Oneil.  She had 1 episode of A-fib with RVR back in April 2023.  She went to [facility] at that time.  She was started on Eliquis but that was discontinued a month later.  Patient denies any sustained arrhythmias since that episode but complains of intermittent heart palpitations.  Her monitor results showed sinus rhythm with heart rates 43- bpm, 1 episode of nonsustained VT and 84 episode of nonsustained SVT lasting up to 12 seconds.  No episodes of A-fib were seen.       Past Medical History:  She has a past medical history of A-fib (Multi), Acute urinary tract infection (11/28/2023), Adverse reaction to drug (11/28/2023), Allergic reaction (11/28/2023), Anemia, Ankylosing spondylitis, Breast injury (not sure), Delayed emergence from general anesthesia, Diabetes mellitus (Multi), Fever (11/28/2023), Genetic testing (2021), Gout, History of abnormal cervical Pap smear, History of blood transfusion, Hyperlipidemia, Hypertension, Memory deficit, OA (osteoarthritis), RALPH on CPAP, PONV (postoperative nausea and vomiting), Psoriatic arthritis (Multi), Rotator cuff tear (11/28/2023), Vitamin D deficiency, and Wears glasses.    She has no past medical history of Personal history of irradiation.    Past Surgical History:  She has a past surgical history that includes Joint replacement (Bilateral); Hysterectomy; Rotator cuff repair (Left); Colonoscopy (2018); Knee arthroscopy w/ meniscal repair (Right); Hernia repair; Foot Tendon Surgery (Bilateral); Nasal septum surgery; [place] tooth extraction; Right oophorectomy; and Oophorectomy (unsure).      Social History:  She reports that she has never smoked. She has never used smokeless  tobacco. She reports that she does not drink alcohol and does not use drugs.    Family History:  Family History   Problem Relation Name Age of Onset    Lung cancer Mother      Dementia Mother      Breast cancer Sister          Allergies:  Amoxicillin, Articaine-epinephrine bitart, Benzocaine, and Epinephrine    Outpatient Medications:  Current Outpatient Medications   Medication Instructions    amoxicillin (Amoxil) 500 mg tablet Take1 tablet by mouth twice a day    aspirin 81 mg, Daily    B complex-vitamin C-folic acid (Nephro-Kaela Rx) 1- mg-mg-mcg tablet 1 tablet, Daily with breakfast    cholecalciferol (vitamin D3) 2,000 Units, 2 times daily    clindamycin (Cleocin) 300 mg capsule Take 2 capsules by mouth 1 hour prior to procedure and 1 capsule by mouth 6 hours post procedure. save remainder for future appointments    clotrimazole-betamethasone (Lotrisone) cream 1 Application, Topical, 2 times daily PRN    EPINEPHrine (Epipen) 0.3 mg/0.3 mL injection syringe INJECT 1 SYRINGE INTRAMUSCULARLY AS NEEDED FOR SHORTNESS OF BREATH    hydrOXYzine HCL (ATARAX) 10 mg, oral, Nightly    losartan (COZAAR) 100 mg, oral, Daily    meloxicam (MOBIC) 7.5 mg, oral, Daily    metoprolol succinate XL (TOPROL-XL) 25 mg, oral, Daily    multivitamin tablet 1 tablet, Daily    prochlorperazine (COMPAZINE) 10 mg, oral, Every 6 hours PRN    rosuvastatin (CRESTOR) 5 mg, oral, Daily        Last Recorded Vitals:  Vitals:    01/30/25 1405   BP: 130/88   Pulse: 61   SpO2: 95%   Weight: 114 kg (251 lb 14 oz)       Physical Exam:  Physical Exam  Constitutional:       Appearance: Normal appearance.   Cardiovascular:      Rate and Rhythm: Normal rate and regular rhythm.      Heart sounds: No murmur heard.     No friction rub. No gallop.   Pulmonary:      Effort: Pulmonary effort is normal.      Breath sounds: Normal breath sounds.   Abdominal:      Palpations: Abdomen is soft.   Musculoskeletal:      Cervical back: Neck supple.   Neurological:       Mental Status: She is alert.   Psychiatric:         Mood and Affect: Mood normal.         Behavior: Behavior normal.             Last Labs:  CBC -  Lab Results   Component Value Date    WBC 6.2 08/17/2024    HGB 12.9 08/17/2024    HCT 38.9 08/17/2024    MCV 92 08/17/2024     08/17/2024       CMP -  Lab Results   Component Value Date    CALCIUM 9.1 08/17/2024    PHOS CANCELED 04/26/2023    PROT 7.2 08/17/2024    ALBUMIN 4.5 08/17/2024    AST 27 08/17/2024    ALT 27 08/17/2024    ALKPHOS 58 08/17/2024    BILITOT 0.7 08/17/2024       LIPID PANEL -   Lab Results   Component Value Date    CHOL 210 (H) 08/17/2024    TRIG 159 (H) 08/17/2024    HDL 44.2 08/17/2024    CHHDL 4.8 08/17/2024    LDLF 127 (H) 05/26/2023    VLDL 32 08/17/2024    NHDL 166 (H) 08/17/2024       RENAL FUNCTION PANEL -   Lab Results   Component Value Date    GLUCOSE 119 (H) 08/17/2024     08/17/2024    K 4.2 08/17/2024     08/17/2024    CO2 24 08/17/2024    ANIONGAP 16 08/17/2024    BUN 28 (H) 08/17/2024    CREATININE 0.97 08/17/2024    GFRMALE CANCELED 04/26/2023    CALCIUM 9.1 08/17/2024    PHOS CANCELED 04/26/2023    ALBUMIN 4.5 08/17/2024        Lab Results   Component Value Date    BNP 77 02/15/2023    HGBA1C 6.1 (H) 08/17/2024    HGBA1C 6.0 11/28/2023       Last Cardiology Tests:      Echo:  (04/25/2023):    CONCLUSIONS:  1. Left ventricular systolic function is normal with a 60-65% estimated ejection fraction.  2. Spectral Doppler shows an impaired relaxation pattern of left ventricular diastolic filling.  3. RVSP within normal limits.       Stress Test:  Nuclear Stress Test 12/05/2024    IMPRESSION:  No evidence of inducible myocardial ischemia or prior infarction.      The left ventricle is normal in size.      Normal LV wall motion with a post-stress LV EF estimated at greater  than 65%.      Diagnostic review: I have personally reviewed the result(s)     Assessment/Plan   Diagnoses and all orders for this  visit:  Palpitations  -     apixaban (Eliquis) 5 mg tablet; Take 1 tablet (5 mg) by mouth 2 times a day.  Rapid atrial fibrillation (Multi)  -     apixaban (Eliquis) 5 mg tablet; Take 1 tablet (5 mg) by mouth 2 times a day.  -     Referral to Clinical Pharmacy; Future  Nonsustained paroxysmal supraventricular tachycardia (CMS-HCC)    Patient followed by Dr. Iglesias and Rosa Oneil.  She had 1 episode of A-fib with RVR back in April 2023.  She went to [facility] at that time.  She was started on Eliquis but that was discontinued a month later.  Patient denies any sustained arrhythmias since that episode but complains of intermittent heart palpitations.  Her monitor results showed sinus rhythm with heart rates 43- bpm, 1 episode of nonsustained VT and 84 episode of nonsustained SVT lasting up to 12 seconds.  No episodes of A-fib were seen.  I had a long conversation about A-fib and treatment options with the patient.  We talked about starting flecainide, but the patient is allergic to articaine-epinephrine, and for this reason we will not start flecainide. Instead, we will initiate Multaq. Will stop aspirin and start Eliquis.  Will put a consult to clinical pharmacy.  Will schedule follow-up to reassess.    Taiwo Mallory MD

## 2025-02-03 ENCOUNTER — PHARMACY VISIT (OUTPATIENT)
Dept: PHARMACY | Facility: CLINIC | Age: 65
End: 2025-02-03
Payer: COMMERCIAL

## 2025-02-04 LAB
LABORATORY COMMENT REPORT: NORMAL
PATH REPORT.FINAL DX SPEC: NORMAL
PATH REPORT.GROSS SPEC: NORMAL
PATH REPORT.RELEVANT HX SPEC: NORMAL
PATH REPORT.TOTAL CANCER: NORMAL

## 2025-02-07 DIAGNOSIS — M62.89 PELVIC FLOOR DYSFUNCTION: Primary | ICD-10-CM

## 2025-02-07 DIAGNOSIS — R10.2 PELVIC PAIN IN FEMALE: ICD-10-CM

## 2025-02-17 PROCEDURE — RXMED WILLOW AMBULATORY MEDICATION CHARGE

## 2025-02-18 ENCOUNTER — PHARMACY VISIT (OUTPATIENT)
Dept: PHARMACY | Facility: CLINIC | Age: 65
End: 2025-02-18
Payer: COMMERCIAL

## 2025-02-24 ENCOUNTER — PHARMACY VISIT (OUTPATIENT)
Dept: PHARMACY | Facility: CLINIC | Age: 65
End: 2025-02-24
Payer: COMMERCIAL

## 2025-02-24 ENCOUNTER — APPOINTMENT (OUTPATIENT)
Dept: UROLOGY | Facility: CLINIC | Age: 65
End: 2025-02-24
Payer: COMMERCIAL

## 2025-02-24 DIAGNOSIS — N32.81 OAB (OVERACTIVE BLADDER): ICD-10-CM

## 2025-02-24 LAB
POC APPEARANCE, URINE: ABNORMAL
POC BILIRUBIN, URINE: NEGATIVE
POC BLOOD, URINE: NEGATIVE
POC COLOR, URINE: ABNORMAL
POC GLUCOSE, URINE: NEGATIVE MG/DL
POC KETONES, URINE: NEGATIVE MG/DL
POC LEUKOCYTES, URINE: ABNORMAL
POC NITRITE,URINE: POSITIVE
POC PH, URINE: 6 PH
POC PROTEIN, URINE: NEGATIVE MG/DL
POC SPECIFIC GRAVITY, URINE: 1.02
POC UROBILINOGEN, URINE: 1 EU/DL

## 2025-02-24 PROCEDURE — RXMED WILLOW AMBULATORY MEDICATION CHARGE

## 2025-02-24 PROCEDURE — 52287 CYSTOSCOPY CHEMODENERVATION: CPT | Performed by: STUDENT IN AN ORGANIZED HEALTH CARE EDUCATION/TRAINING PROGRAM

## 2025-02-24 RX ORDER — INDOMETHACIN 25 MG/1
10 CAPSULE ORAL ONCE
Status: COMPLETED | OUTPATIENT
Start: 2025-02-24 | End: 2025-02-24

## 2025-02-24 RX ORDER — NITROFURANTOIN 25; 75 MG/1; MG/1
100 CAPSULE ORAL 2 TIMES DAILY
Qty: 10 CAPSULE | Refills: 0 | Status: SHIPPED | OUTPATIENT
Start: 2025-02-24 | End: 2025-03-01

## 2025-02-24 RX ORDER — LIDOCAINE HYDROCHLORIDE 10 MG/ML
50 INJECTION, SOLUTION INFILTRATION; PERINEURAL ONCE
Status: COMPLETED | OUTPATIENT
Start: 2025-02-24 | End: 2025-02-24

## 2025-02-24 RX ADMIN — LIDOCAINE HYDROCHLORIDE 50 ML: 10 INJECTION, SOLUTION INFILTRATION; PERINEURAL at 13:32

## 2025-02-24 RX ADMIN — INDOMETHACIN 10 MEQ: 25 CAPSULE ORAL at 13:32

## 2025-02-24 NOTE — PROGRESS NOTES
HISTORY OF PRESENT ILLNESS:  Melany Garcia is a 64 y.o. female who presents today for a botox injection.          Past Medical History  She has a past medical history of A-fib (Multi), Acute urinary tract infection (11/28/2023), Adverse reaction to drug (11/28/2023), Allergic reaction (11/28/2023), Anemia, Ankylosing spondylitis, Breast injury (not sure), Delayed emergence from general anesthesia, Diabetes mellitus (Multi), Fever (11/28/2023), Genetic testing (2021), Gout, History of abnormal cervical Pap smear, History of blood transfusion, Hyperlipidemia, Hypertension, Memory deficit, OA (osteoarthritis), RALPH on CPAP, PONV (postoperative nausea and vomiting), Psoriatic arthritis (Multi), Rotator cuff tear (11/28/2023), Vitamin D deficiency, and Wears glasses.    She has no past medical history of Personal history of irradiation.    Surgical History  She has a past surgical history that includes Joint replacement (Bilateral); Hysterectomy; Rotator cuff repair (Left); Colonoscopy (2018); Knee arthroscopy w/ meniscal repair (Right); Hernia repair; Foot Tendon Surgery (Bilateral); Nasal septum surgery; [place] tooth extraction; Right oophorectomy; and Oophorectomy (unsure).     Social History  She reports that she has never smoked. She has never used smokeless tobacco. She reports that she does not drink alcohol and does not use drugs.    Family History  Family History   Problem Relation Name Age of Onset    Lung cancer Mother      Dementia Mother      Breast cancer Sister          Allergies  Amoxicillin, Articaine-epinephrine bitart, Benzocaine, and Epinephrine      A comprehensive 10+ review of systems was negative except for: see hpi                          PHYSICAL EXAMINATION:  BP Readings from Last 3 Encounters:   01/30/25 130/88   01/28/25 138/88   12/04/24 117/69      Wt Readings from Last 3 Encounters:   01/30/25 114 kg (251 lb 14 oz)   01/28/25 115 kg (253 lb 3.2 oz)   12/04/24 116 kg (255 lb)      BMI:  "  Estimated body mass index is 37.74 kg/m² as calculated from the following:    Height as of 12/4/24: 1.74 m (5' 8.5\").    Weight as of 1/30/25: 114 kg (251 lb 14 oz).  BSA:   Estimated body surface area is 2.35 meters squared as calculated from the following:    Height as of 12/4/24: 1.74 m (5' 8.5\").    Weight as of 1/30/25: 114 kg (251 lb 14 oz).  HEENT: Normocephalic, atraumatic, PER EOMI, nonicteric, trachea normal, thyroid normal, oropharynx normal.  CARDIAC: regular rate & rhythm, S1 & S2 normal.  No heaves, thrills, gallops or murmurs.  LUNGS: Clear to auscultation, no spinal or CV tenderness.  EXTREMITIES: No evidence of cyanosis, clubbing or edema.       Botox Injection    Melany came today for Botox injection.  I reviewed with her the risks and benefits including ptosis, infection, and bleeding. She has no contraindications. She is on no antibiotics and has no neuromuscular disorders.  Pregnancy is not an issue.     She tolerated the procedure well.  The patient  will return to see me again in three to four months, earlier if there are any problems.     Melany understands the side effects and risks, as well as the necessity for continued treatment to maintain improvement.  Additional therapy may be necessary. Call if there are any problems. See diagram for injection sites and dosages. 100 units were used at a concentration of 10 units per 0.1 mL.         Assessment:  Melany Garcia is a 63 y.o. who presents with OAB     -has failed multiple AC meds including vesicare and oxybutynin   -UDS shows detrusor overactivity and normal emptying, no evidence of obstruction  -Negative cystoscopy 5/13/2024  -Also discussed the blueprint device, she is most interested in this, I gave her informational handouts and she will be prescreened for the Revi trial  -S/P botox, 100 units, 2/24/25  -Rx abx       Follow up in 6 weeks       All questions and concerns were answered and addressed.  The patient expressed " understanding and agrees with the plan.     Michael Campos MD    Scribe Attestation  By signing my name below, I, Graciela De La Garza, Scribjose manuel   attest that this documentation has been prepared under the direction and in the presence of Michael Campos MD.

## 2025-02-27 ENCOUNTER — APPOINTMENT (OUTPATIENT)
Dept: PHARMACY | Facility: HOSPITAL | Age: 65
End: 2025-02-27
Payer: COMMERCIAL

## 2025-02-27 DIAGNOSIS — I48.91 RAPID ATRIAL FIBRILLATION (MULTI): ICD-10-CM

## 2025-02-27 LAB — BACTERIA UR CULT: ABNORMAL

## 2025-02-27 RX ORDER — ELECTROLYTES/DEXTROSE
5 SOLUTION, ORAL ORAL DAILY
COMMUNITY

## 2025-02-27 NOTE — PROGRESS NOTES
Pharmacist Clinic: Cardiology Management    Melany Garcia is a 64 y.o. female was referred to Clinical Pharmacy Team for anticoagulation management.     Referring Provider: Taiwo Amor MD    THIS IS A NEW PATIENT APPOINTMENT. PATIENT WILL BE ESTABLISHING CARE WITH CLINICAL PHARMACY.    Appointment was completed by Melany who was reached at primary number.    Allergies Reviewed? 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 180 tablet 3    B complex-vitamin C-folic acid (Nephro-[facility]aela Rx) 1- mg-mg-mcg tablet Take 1 tablet by mouth once daily with breakfast.      biotin 5 mg capsule Take 1 capsule (5 mg) by mouth once daily.      cholecalciferol, vitamin D3, 100 mcg (4,000 unit) tablet Take 2,000 Units by mouth 2 times a day.      clindamycin (Cleocin) 300 mg capsule Take 2 capsules by mouth 1 hour prior to procedure and 1  capsule by mouth 6 hours post procedure. save remainder for future appointments 12 capsule 5    clotrimazole-betamethasone (Lotrisone) cream Apply 1 Application topically 2 times a day as needed (itching or irritation). 15 g 3    EPINEPHrine (Epipen) 0.3 mg/0.3 mL injection syringe INJECT 1 SYRINGE INTRAMUSCULARLY AS NEEDED FOR SHORTNESS OF BREATH 2 each 0    losartan (Cozaar) 100 mg tablet Take 1 tablet (100 mg) by mouth once daily. 90 tablet 1    meloxicam (Mobic) 7.5 mg tablet Take 1 tablet (7.5 mg) by mouth once daily. 30 tablet 2    metoprolol succinate XL (Toprol-XL) 25 mg 24 hr tablet Take 1 tablet (25 mg) by mouth once daily. 90 tablet 1    multivitamin tablet Take 1 tablet by mouth once daily.      nitrofurantoin, macrocrystal-monohydrate, (Macrobid) 100 mg capsule Take 1 capsule (100 mg) by mouth 2 times a day for 5 days. 10 capsule 0    prochlorperazine (Compazine) 10 mg tablet Take 1 tablet (10 mg) by mouth every 6 hours if needed for nausea or vomiting. 30 tablet 5    rosuvastatin (Crestor) 5 mg tablet Take 1 tablet (5 mg) by mouth once daily. 30 tablet 11    dronedarone (Multaq) 400 mg tablet Take 1 tablet (400 mg) by mouth 2 times a day. (Patient not taking: Reported on 2/27/2025) 60 tablet 11    hydrOXYzine HCL (Atarax) 10 mg tablet Take 1 tablet (10 mg) by mouth once daily at bedtime. (Patient not taking: Reported on 2/27/2025) 90 tablet 1    [DISCONTINUED] amoxicillin (Amoxil) 500 mg tablet Take1 tablet by mouth twice a day 2 tablet 0     No current facility-administered medications on file prior to visit.         RELEVANT LAB RESULTS:  Lab Results   Component Value Date    BILITOT 0.7 08/17/2024    CALCIUM 9.1 08/17/2024    CO2 24 08/17/2024     08/17/2024    CREATININE 0.97 08/17/2024    GLUCOSE 119 (H) 08/17/2024    ALKPHOS 58 08/17/2024    K 4.2 08/17/2024    PROT 7.2 08/17/2024     08/17/2024    AST 27 08/17/2024    ALT 27 08/17/2024    BUN 28 (H) 08/17/2024    ANIONGAP 16  "08/17/2024    MG 1.95 08/09/2022    PHOS CANCELED 04/26/2023    ALBUMIN 4.5 08/17/2024    AMYLASE 101 06/01/2022    LIPASE 108 (H) 06/16/2023    GFRF 87 05/26/2023    GFRMALE CANCELED 04/26/2023     Lab Results   Component Value Date    TRIG 159 (H) 08/17/2024    CHOL 210 (H) 08/17/2024    LDLCALC 134 (H) 08/17/2024    HDL 44.2 08/17/2024     No results found for: \"BMCBC\", \"CBCDIF\"     PHARMACEUTICAL ASSESSMENT:    MEDICATION RECONCILIATION    Home Pharmacy Reviewed? Yes, describe: [facility]    Added:  - Biotin 5,000mcg daily    Removed:  - Amoxicillin    Drug Interactions? No    Medication Documentation Review Audit       Reviewed by Jagruti Ball, PharmD (Pharmacist) on 02/27/25 at 1310      Medication Order Taking? Sig Documenting Provider Last Dose Status      Discontinued 02/27/25 1307   apixaban (Eliquis) 5 mg tablet [id number] Yes Take 1 tablet (5 mg) by mouth 2 times a day. Taiwo Amor MD  Active   B complex-vitamin C-folic acid (Nephro-Kaela Rx) 1- mg-mg-mcg tablet [id number] Yes Take 1 tablet by mouth once daily with breakfast. Historical Provider, MD  Active   cholecalciferol, vitamin D3, 100 mcg (4,000 unit) tablet [id number] Yes Take 2,000 Units by mouth 2 times a day. Historical Provider, MD  Active   clindamycin (Cleocin) 300 mg capsule [id number] Yes Take 2 capsules by mouth 1 hour prior to procedure and 1 capsule by mouth 6 hours post procedure. save remainder for future appointments   Active   clotrimazole-betamethasone (Lotrisone) cream [id number] Yes Apply 1 Application topically 2 times a day as needed (itching or irritation). Mahogany Garza PA-C  Active   dronedarone (Multaq) 400 mg tablet [id number]  Take 1 tablet (400 mg) by mouth 2 times a day.   Patient not taking: Reported on 2/27/2025    Taiwo Amor MD  Active   EPINEPHrine (Epipen) 0.3 mg/0.3 mL injection syringe [id number] Yes INJECT 1 SYRINGE INTRAMUSCULARLY AS NEEDED FOR SHORTNESS OF BREATH Sammy BURGESS" ITALO Sanders  Active   hydrOXYzine HCL (Atarax) 10 mg tablet [id number]  Take 1 tablet (10 mg) by mouth once daily at bedtime.   Patient not taking: Reported on 2/27/2025    Nadine Holder PA-C  Active   losartan (Cozaar) 100 mg tablet [id number] Yes Take 1 tablet (100 mg) by mouth once daily. STELLA Willingham-CNP  Active   meloxicam (Mobic) 7.5 mg tablet [id number] Yes Take 1 tablet (7.5 mg) by mouth once daily. Johnna Jeong DPM  Active   metoprolol succinate XL (Toprol-XL) 25 mg 24 hr tablet [id number] Yes Take 1 tablet (25 mg) by mouth once daily. STELLA Willingham-CNP  Active   multivitamin tablet [id number] Yes Take 1 tablet by mouth once daily. Annemarie Obregon MD  Active   nitrofurantoin, macrocrystal-monohydrate, (Macrobid) 100 mg capsule [id number] Yes Take 1 capsule (100 mg) by mouth 2 times a day for 5 days. Michael Campos MD  Active   prochlorperazine (Compazine) 10 mg tablet [id number] Yes Take 1 tablet (10 mg) by mouth every 6 hours if needed for nausea or vomiting. Freddy Bowling DO  Active   rosuvastatin (Crestor) 5 mg tablet [id number] Yes Take 1 tablet (5 mg) by mouth once daily. STELLA Willingham-EVA  Active                    DISEASE MANAGEMENT ASSESSMENT:     ATRIAL FIBRILLATION ASSESSMENT:     Encounter Date: 11/19/24   ECG 12 lead (Clinic Performed)   Result Value    Ventricular Rate 66    Atrial Rate 66    MA Interval 140    QRS Duration 96    QT Interval 440    QTC Calculation(Bazett) 461    P Axis 51    R Axis 57    T Axis 81    QRS Count 11    Q Onset 216    P Onset 146    P Offset 195    T Offset 436    QTC Fredericia 454    Narrative    Normal sinus rhythm  Normal ECG  When compared with ECG of 04-AUG-2022 09:57,  No significant change was found      BP Readings from Last 3 Encounters:   01/30/25 130/88   01/28/25 138/88   12/04/24 117/69      Pulse Readings from Last 3 Encounters:   01/30/25 61   11/19/24 67   10/15/24 69        CURRENT PHARMACOTHERAPY:    Eliquis 5mg twice daily  Metoprolol succinate 25mg daily    RELEVANT PAST MEDICAL HISTORY:   Afib, HTN HLD, DM    RATE CONTROL:   Non-dihydropyridine CCB: No  Beta blocker: Yes, describe: Metoprolol succinate 25mg daily  Digoxin: No  Dronedarone: Yes, describe: 400mg twice daily- not taking as prescribed due to cost    RHYTHM CONTROL:   Hx of Electrical Cardioversion? No   Hx of Ablation? No   Antiarrythmic: No-Multaq 400mg twice daily prescribed but not taking due to cost    ANTICOAGULATION ASSESSMENT:   DIAGNOSIS: prevention of nonvalvular atrial fibrilliation stroke and systemic embolism  - Patient is projected to be on anticoagulation long term  [id number] Score: [3]   Age: [<65 (0)] [65-74 (+1)] [> 75 (+2)]: 0  Sex: [Male/Female (+1)]: 1  CHF history: [No/Yes(+1)]: 0  Hypertension history: [No/Yes(+1)]: 1  Stroke/TIA/thromboembolism history: [No/Yes(+2)]: 0  Vascular disease history (prior MI, peripheral artery disease, aortic plaque): [No/Yes(+1)]: 0  Diabetes history: [No/Yes(+1)]: 1  Is the patient currently on anticoagulation therapy?   Yes, describe: Eliquis 5mg twice daily  65yo  114kg  Scr 0.97 (08/17/2024)      Affordability/Accessibility:  PAP screen  Adherence/Organization: adherent to Eliquis, not taking Multaq  Adverse Reactions: none reported to eliquis  Recent Hospitalizations: none  Recent Falls/Trauma: none reported  Lifestyle Management:   Current symptoms: shortness of breath and fatigue  How often is patient experiencing episodes of a fib? Patient unable to provide specific number  Is the patient monitoring their heart rate at home? Yes, describe: watch takes HR, averages 65-81  Tobacco Use: No  Alcohol Use: No  Caffeine Use: Yes, describe: patient does not drink coffee daily- when she does drink coffee, she only has 1 cup    EDUCATION/COUNSELING:   Counseled patient on MOA, expectations, duration of therapy, contraindications, administration, and monitoring parameters  Counseled  patient of side effects that are indicative of bleeding such as dark tarry stool, unexplainable bruising, or vomiting up a coffee ground like substance  Counseled patient on common symptoms of active atrial fibrillation such as palpitations, fatigue, shortness of breath, dizziness/lightheadedness, or chest pain/discomfort  Counseled patient on the importance of physical activity - recommended 210 minutes per week of moderate-to-vigorous exercise  Counseled patient on the importance of limiting or eliminating tobacco, alcohol, and caffeine use     DISCUSSION/NOTES:   Today was an initial visit to establish with clinical pharmacy. Patient medications and allergies were reviewed and updated.   Patient states she is doing well on anticoagulation therapy. Patient reports adherence to Eliquis, no adverse effects, and denies s/s of bleeding. Patient is not currently taking Multaq due to the cost.   Patient explains that she is following up with an allergist on Monday for her articaine allergy, she states there was conversation of cardiologist wanting to start flecainide but is not going to due to the articaine allergy. Patient reports the flecainide is more cost effective in the long run because Multaq is $75/30 day supply.   Patient was screened for  PAP. Patient has not filed taxes for the 2023/2024 year therefore cannot submit PAP application currently. Explained to patient once I receive income documentation and she is approved for  PAP her Multaq and Eliquis will be no cost to her. Patient verbalized understanding and is going to keep [facility] updated about upcoming allergist visit and taxes.    ASSESSMENT:     Patient Assistance Program (PAP)    Application for program to be submitted for the following medications: Eliquis, Multaq    [facility] Permanent Address: [place]   Prescription Insurance:   Yes   Members of Household: 1   Files Taxes: Yes       Patient will be email financial information to pharmacist directly  at [email or web address].    Patient verbally reports monthly or yearly income which is less than 400% federal poverty level    Patient aware this process may take up to 6 weeks.     If approved medication must be filled through [facility] and MEDICATION WILL BE MAILED TO PATIENT.       Assessment/Plan   Problem List Items Addressed This Visit       Rapid atrial fibrillation (Multi)     Patient age, weight and renal function appropriate to continue Eliquis 5mg twice daily. Patient reported HR averages between 65-81bpm.         Relevant Orders    Referral to Clinical Pharmacy         RECOMMENDATIONS/PLAN:    CONTINUE  Eliquis 5mg twice daily  Multaq 400mg twice daily as prescribed    Last Appnt with Referring Provider: 01/30/2025  Next Appnt with Referring Provider: 07/31/2025  Clinical Pharmacist follow up: 1 month  VAF/Application Expiration: No; pending  Type of Encounter: Rafael Ball, PharmD    Verbal consent to manage patient's drug therapy was obtained from the patient . They were informed they may decline to participate or withdraw from participation in pharmacy services at any time.    Continue all meds under the continuation of care with the referring provider and clinical pharmacy team.

## 2025-02-27 NOTE — Clinical Note
Hello,  Today I spoke with Melany about her Eliquis, Multaq and our cost assistance program. Patient states she is doing well on anticoagulation therapy. Patient reports adherence to Eliquis, no adverse effects, and denies s/s of bleeding. Patient is not currently taking Multaq due to the cost. Patient explains that she is following up with an allergist on Monday for her articaine allergy with flecainide. Patient reports the flecainide is more cost effective in the long run because Multaq is $75/30 day supply. Explained to patient once I receive income documentation and she is approved for  [employer] her Multaq and Eliquis will be no cost to her. Unfortunately, at this time patient cannot be enrolled in  PAP because she has not filed taxes for 2023 or 2024. Plan to follow up in 1 month. Thank you!

## 2025-02-27 NOTE — ASSESSMENT & PLAN NOTE
Patient age, weight and renal function appropriate to continue Eliquis 5mg twice daily. Patient reported HR averages between 65-81bpm.

## 2025-03-07 DIAGNOSIS — I48.91 RAPID ATRIAL FIBRILLATION (MULTI): ICD-10-CM

## 2025-03-07 RX ORDER — FLECAINIDE ACETATE 100 MG/1
100 TABLET ORAL DAILY
Qty: 30 TABLET | Refills: 11 | Status: SHIPPED | OUTPATIENT
Start: 2025-03-07 | End: 2026-03-07

## 2025-03-10 ENCOUNTER — PHARMACY VISIT (OUTPATIENT)
Dept: PHARMACY | Facility: CLINIC | Age: 65
End: 2025-03-10
Payer: COMMERCIAL

## 2025-03-10 PROCEDURE — RXMED WILLOW AMBULATORY MEDICATION CHARGE

## 2025-03-10 RX ORDER — MELOXICAM 7.5 MG/1
7.5 TABLET ORAL DAILY
Qty: 30 TABLET | Refills: 2 | Status: CANCELLED | OUTPATIENT
Start: 2025-03-10

## 2025-03-28 DIAGNOSIS — Z79.1 NSAID LONG-TERM USE: Primary | ICD-10-CM

## 2025-04-04 ENCOUNTER — APPOINTMENT (OUTPATIENT)
Dept: PHARMACY | Facility: HOSPITAL | Age: 65
End: 2025-04-04
Payer: COMMERCIAL

## 2025-04-07 DIAGNOSIS — I48.91 RAPID ATRIAL FIBRILLATION (MULTI): ICD-10-CM

## 2025-04-07 DIAGNOSIS — E78.5 HYPERLIPIDEMIA, UNSPECIFIED HYPERLIPIDEMIA TYPE: ICD-10-CM

## 2025-04-07 PROCEDURE — RXMED WILLOW AMBULATORY MEDICATION CHARGE

## 2025-04-07 RX ORDER — ROSUVASTATIN CALCIUM 5 MG/1
5 TABLET, COATED ORAL DAILY
Qty: 90 TABLET | Refills: 3 | Status: SHIPPED | OUTPATIENT
Start: 2025-04-07 | End: 2026-04-07

## 2025-04-07 RX ORDER — FLECAINIDE ACETATE 100 MG/1
100 TABLET ORAL DAILY
Qty: 90 TABLET | Refills: 3 | Status: SHIPPED | OUTPATIENT
Start: 2025-04-07 | End: 2026-04-07

## 2025-04-08 ENCOUNTER — PHARMACY VISIT (OUTPATIENT)
Dept: PHARMACY | Facility: CLINIC | Age: 65
End: 2025-04-08
Payer: COMMERCIAL

## 2025-04-08 LAB
ALBUMIN SERPL-MCNC: 4.6 G/DL (ref 3.6–5.1)
BUN SERPL-MCNC: 25 MG/DL (ref 7–25)
BUN/CREAT SERPL: ABNORMAL (CALC) (ref 6–22)
CALCIUM SERPL-MCNC: 9.2 MG/DL (ref 8.6–10.4)
CHLORIDE SERPL-SCNC: 106 MMOL/L (ref 98–110)
CO2 SERPL-SCNC: 25 MMOL/L (ref 20–32)
CREAT SERPL-MCNC: 0.92 MG/DL (ref 0.5–1.05)
EGFRCR SERPLBLD CKD-EPI 2021: 70 ML/MIN/1.73M2
GLUCOSE SERPL-MCNC: 108 MG/DL (ref 65–99)
PHOSPHATE SERPL-MCNC: 2.8 MG/DL (ref 2.5–4.5)
POTASSIUM SERPL-SCNC: 4.2 MMOL/L (ref 3.5–5.3)
SODIUM SERPL-SCNC: 141 MMOL/L (ref 135–146)

## 2025-04-13 NOTE — PROGRESS NOTES
HISTORY OF PRESENT ILLNESS:  Melany Garcia is a 64 y.o. female who presents today for a follow up visit. She is doing well currently.  She has had improvement with her botox.          Past Medical History  She has a past medical history of A-fib (Multi), Acute urinary tract infection (11/28/2023), Adverse reaction to drug (11/28/2023), Allergic reaction (11/28/2023), Anemia, Ankylosing spondylitis, Breast injury (not sure), Delayed emergence from general anesthesia, Diabetes mellitus (Multi), Fever (11/28/2023), Genetic testing (2021), Gout, History of abnormal cervical Pap smear, History of blood transfusion, Hyperlipidemia, Hypertension, Memory deficit, OA (osteoarthritis), RALPH on CPAP, PONV (postoperative nausea and vomiting), Psoriatic arthritis (Multi), Rotator cuff tear (11/28/2023), Vitamin D deficiency, and Wears glasses.    She has no past medical history of Personal history of irradiation.    Surgical History  She has a past surgical history that includes Joint replacement (Bilateral); Hysterectomy; Rotator cuff repair (Left); Colonoscopy (2018); Knee arthroscopy w/ meniscal repair (Right); Hernia repair; Foot Tendon Surgery (Bilateral); Nasal septum surgery; [place] tooth extraction; Right oophorectomy; and Oophorectomy (unsure).     Social History  She reports that she has never smoked. She has never used smokeless tobacco. She reports that she does not drink alcohol and does not use drugs.    Family History  Family History   Problem Relation Name Age of Onset    Lung cancer Mother      Dementia Mother      Breast cancer Sister          Allergies  Benzocaine      A comprehensive 10+ review of systems was negative except for: see hpi                          PHYSICAL EXAMINATION:  BP Readings from Last 3 Encounters:   01/30/25 130/88   01/28/25 138/88   12/04/24 117/69      Wt Readings from Last 3 Encounters:   01/30/25 114 kg (251 lb 14 oz)   01/28/25 115 kg (253 lb 3.2 oz)   12/04/24 116 kg (255 lb)  "     BMI: Estimated body mass index is 37.74 kg/m² as calculated from the following:    Height as of 12/4/24: 1.74 m (5' 8.5\").    Weight as of 1/30/25: 114 kg (251 lb 14 oz).  BSA: Estimated body surface area is 2.35 meters squared as calculated from the following:    Height as of 12/4/24: 1.74 m (5' 8.5\").    Weight as of 1/30/25: 114 kg (251 lb 14 oz).  HEENT: Normocephalic, atraumatic, PER EOMI, nonicteric, trachea normal, thyroid normal, oropharynx normal.  CARDIAC: regular rate & rhythm, S1 & S2 normal.  No heaves, thrills, gallops or murmurs.  LUNGS: Clear to auscultation, no spinal or CV tenderness.  EXTREMITIES: No evidence of cyanosis, clubbing or edema.               Assessment:  Melany Garcia is a 63 y.o. who presents with OAB    OAB      -has failed multiple AC meds including vesicare and oxybutynin   -UDS shows detrusor overactivity and normal emptying, no evidence of obstruction  -Negative cystoscopy 5/13/2024    -S/P botox, 100 units, 2/24/25, % better  -Doing well       Follow up in 3 months        All questions and concerns were answered and addressed.  The patient expressed understanding and agrees with the plan.     Michael Campos MD    Scribe Attestation  By signing my name below, IGraciela, Scribe   attest that this documentation has been prepared under the direction and in the presence of Michael Campos MD.  "

## 2025-04-14 ENCOUNTER — APPOINTMENT (OUTPATIENT)
Dept: UROLOGY | Facility: CLINIC | Age: 65
End: 2025-04-14
Payer: COMMERCIAL

## 2025-04-14 DIAGNOSIS — N32.81 OAB (OVERACTIVE BLADDER): Primary | ICD-10-CM

## 2025-04-14 PROCEDURE — G2211 COMPLEX E/M VISIT ADD ON: HCPCS | Performed by: STUDENT IN AN ORGANIZED HEALTH CARE EDUCATION/TRAINING PROGRAM

## 2025-04-14 PROCEDURE — 4010F ACE/ARB THERAPY RXD/TAKEN: CPT | Performed by: STUDENT IN AN ORGANIZED HEALTH CARE EDUCATION/TRAINING PROGRAM

## 2025-04-14 PROCEDURE — 99214 OFFICE O/P EST MOD 30 MIN: CPT | Performed by: STUDENT IN AN ORGANIZED HEALTH CARE EDUCATION/TRAINING PROGRAM

## 2025-04-15 DIAGNOSIS — M19.072 ARTHRITIS OF BOTH MIDFEET: Primary | ICD-10-CM

## 2025-04-15 DIAGNOSIS — M19.071 ARTHRITIS OF BOTH MIDFEET: Primary | ICD-10-CM

## 2025-04-15 PROCEDURE — RXMED WILLOW AMBULATORY MEDICATION CHARGE

## 2025-04-15 RX ORDER — MELOXICAM 7.5 MG/1
7.5 TABLET ORAL DAILY
Qty: 90 TABLET | Refills: 0 | Status: SHIPPED | OUTPATIENT
Start: 2025-04-15 | End: 2025-07-15

## 2025-04-16 ENCOUNTER — PHARMACY VISIT (OUTPATIENT)
Dept: PHARMACY | Facility: CLINIC | Age: 65
End: 2025-04-16
Payer: COMMERCIAL

## 2025-04-23 PROCEDURE — RXMED WILLOW AMBULATORY MEDICATION CHARGE

## 2025-04-24 ENCOUNTER — PHARMACY VISIT (OUTPATIENT)
Dept: PHARMACY | Facility: CLINIC | Age: 65
End: 2025-04-24
Payer: COMMERCIAL

## 2025-05-05 ENCOUNTER — HOSPITAL ENCOUNTER (EMERGENCY)
Facility: HOSPITAL | Age: 65
Discharge: HOME | End: 2025-05-05
Attending: EMERGENCY MEDICINE
Payer: COMMERCIAL

## 2025-05-05 VITALS
RESPIRATION RATE: 18 BRPM | DIASTOLIC BLOOD PRESSURE: 78 MMHG | TEMPERATURE: 98.6 F | OXYGEN SATURATION: 98 % | HEART RATE: 61 BPM | HEIGHT: 69 IN | SYSTOLIC BLOOD PRESSURE: 116 MMHG | WEIGHT: 253 LBS | BODY MASS INDEX: 37.47 KG/M2
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
## 2025-05-05 PROCEDURE — 99281 EMR DPT VST MAYX REQ PHY/QHP: CPT | Performed by: EMERGENCY MEDICINE

## 2025-05-05 ASSESSMENT — PAIN - FUNCTIONAL ASSESSMENT: PAIN_FUNCTIONAL_ASSESSMENT: 0-10

## 2025-05-05 ASSESSMENT — COLUMBIA-SUICIDE SEVERITY RATING SCALE - C-SSRS
2. HAVE YOU ACTUALLY HAD ANY THOUGHTS OF KILLING YOURSELF?: NO
6. HAVE YOU EVER DONE ANYTHING, STARTED TO DO ANYTHING, OR PREPARED TO DO ANYTHING TO END YOUR LIFE?: NO
1. IN THE PAST MONTH, HAVE YOU WISHED YOU WERE DEAD OR WISHED YOU COULD GO TO SLEEP AND NOT WAKE UP?: NO

## 2025-05-05 ASSESSMENT — LIFESTYLE VARIABLES
TOTAL SCORE: 0
EVER FELT BAD OR GUILTY ABOUT YOUR DRINKING: NO
HAVE YOU EVER FELT YOU SHOULD CUT DOWN ON YOUR DRINKING: NO
EVER HAD A DRINK FIRST THING IN THE MORNING TO STEADY YOUR NERVES TO GET RID OF A HANGOVER: NO
HAVE PEOPLE ANNOYED YOU BY CRITICIZING YOUR DRINKING: NO

## 2025-05-05 ASSESSMENT — PAIN SCALES - GENERAL: PAINLEVEL_OUTOF10: 0 - NO PAIN

## 2025-05-05 NOTE — ED PROVIDER NOTES
HPI   Chief Complaint   Patient presents with    Body Fluid Exposure       64-year-old female here for chief complaint of needlestick exposure.  Patient accidentally poked herself with a needlestick from another patient.  No other symptoms or complaints at this time.  Left index finger was affected.  She washed the area right away under water              Patient History   Medical History[1]  Surgical History[2]  Family History[3]  Social History[4]    Physical Exam   ED Triage Vitals [05/05/25 0648]   Temperature Heart Rate Respirations BP   37 °C (98.6 °F) 61 18 116/78      Pulse Ox Temp Source Heart Rate Source Patient Position   98 % Temporal -- Sitting      BP Location FiO2 (%)     Left arm --       Physical Exam  Vitals and nursing note reviewed.   Constitutional:       Appearance: Normal appearance.   HENT:      Head: Normocephalic and atraumatic.      Nose: Nose normal.      Mouth/Throat:      Mouth: Mucous membranes are moist.   Eyes:      Extraocular Movements: Extraocular movements intact.      Pupils: Pupils are equal, round, and reactive to light.   Cardiovascular:      Rate and Rhythm: Normal rate and regular rhythm.   Pulmonary:      Effort: Pulmonary effort is normal.      Breath sounds: Normal breath sounds.   Abdominal:      General: Abdomen is flat.      Palpations: Abdomen is soft.   Musculoskeletal:         General: Normal range of motion.      Cervical back: Normal range of motion.   Skin:     General: Skin is warm and dry.      Capillary Refill: Capillary refill takes less than 2 seconds.   Neurological:      General: No focal deficit present.      Mental Status: She is alert.   Psychiatric:         Mood and Affect: Mood normal.           ED Course & MDM   Diagnoses as of 05/09/25 2236   Needlestick injury accident with exposure to body fluid                 No data recorded     Filiberto Coma Scale Score: 15 (05/05/25 0653 : Shu Gonzalez RN)                           Medical Decision  Making  Medical Decision Making: Patient filled out paperwork does not want Pap at this time.  Will follow-up as indicated.  Differential includes accidental exposure body exposure fluid exposure  Consider Pep  [unfilled]     Julienne Mehta D.O.  Emergency Medicine      Consider PEP    Procedure  Procedures       [1]   Past Medical History:  Diagnosis Date    A-fib (Multi)     Acute urinary tract infection 11/28/2023    Adverse reaction to drug 11/28/2023    Allergic reaction 11/28/2023    Anemia     Ankylosing spondylitis     Breast injury not sure    Delayed emergence from general anesthesia     Diabetes mellitus (Multi)     Fever 11/28/2023    Genetic testing 2021    VUS in the NBN gene    Gout     History of abnormal cervical Pap smear     History of blood transfusion     for anemia    no reaction    Hyperlipidemia     Hypertension     Memory deficit     OA (osteoarthritis)     RALPH on CPAP     PONV (postoperative nausea and vomiting)     Psoriatic arthritis (Multi)     Rotator cuff tear 11/28/2023    right    Vitamin D deficiency     Wears glasses    [2]   Past Surgical History:  Procedure Laterality Date    COLONOSCOPY  2018    FOOT TENDON SURGERY Bilateral     HERNIA REPAIR      HYSTERECTOMY      JOINT REPLACEMENT Bilateral     hips    KNEE ARTHROSCOPY W/ MENISCAL REPAIR Right     NASAL SEPTUM SURGERY      OOPHORECTOMY  unsure    RIGHT OOPHORECTOMY      ROTATOR CUFF REPAIR Left     WISDOM TOOTH EXTRACTION     [3]   Family History  Problem Relation Name Age of Onset    Lung cancer Mother      Dementia Mother      Breast cancer Sister     [4]   Social History  Tobacco Use    Smoking status: Never    Smokeless tobacco: Never   Vaping Use    Vaping status: Never Used   Substance Use Topics    Alcohol use: Never    Drug use: Never        Julienne Mehta,   05/09/25 6212

## 2025-05-07 ENCOUNTER — TELEPHONE (OUTPATIENT)
Dept: CARDIOLOGY | Facility: HOSPITAL | Age: 65
End: 2025-05-07
Payer: COMMERCIAL

## 2025-06-13 PROCEDURE — RXMED WILLOW AMBULATORY MEDICATION CHARGE

## 2025-06-16 ENCOUNTER — PHARMACY VISIT (OUTPATIENT)
Dept: PHARMACY | Facility: CLINIC | Age: 65
End: 2025-06-16
Payer: COMMERCIAL

## 2025-06-16 PROCEDURE — RXMED WILLOW AMBULATORY MEDICATION CHARGE

## 2025-06-17 ENCOUNTER — PHARMACY VISIT (OUTPATIENT)
Dept: PHARMACY | Facility: CLINIC | Age: 65
End: 2025-06-17
Payer: COMMERCIAL

## 2025-07-13 NOTE — PROGRESS NOTES
[facility] Heart and Vascular Electrophysiology    Patient Name: Melany Garcia  Patient : 1960    Referred for  Afib    History of Present Illness:  Melany Garcia is a 64 y.o. year old female patient with:    HLD  HTN managed with losartan.  Arthritis  T2DM  RALPH  Afib with RVR in 2023 where she was started on Eliquis and stopped it after 1 month.  She is currently on Eliquis, flecainide and metoprolol.    Patient followed by Dr. Iglesisa and Rosa Oneil.  She had 1 episode of A-fib with RVR back in 2023.  She went to [facility] at that time.  She was started on Eliquis but that was discontinued a month later.  Patient denies any sustained arrhythmias since that episode but complains of intermittent heart palpitations.  Her monitor results showed sinus rhythm with heart rates 43- bpm, 1 episode of nonsustained VT and 84 episode of nonsustained SVT lasting up to 12 seconds.  No episodes of A-fib were seen. She was started on flecainide. She is here today for a follow up. She states that she has  not felt palpitations since she started flecainide. She still complains of SOB on exertion. Her ECG shows sinus rhythm HR 63 bpm.     Past Medical History:  She has a past medical history of A-fib (Multi), Acute urinary tract infection (2023), Adverse reaction to drug (2023), Allergic reaction (2023), Anemia, Ankylosing spondylitis, Breast injury (not sure), Delayed emergence from general anesthesia, Diabetes mellitus (Multi), Fever (2023), Genetic testing (), Gout, History of abnormal cervical Pap smear, History of blood transfusion, Hyperlipidemia, Hypertension, Memory deficit, OA (osteoarthritis), RALPH on CPAP, PONV (postoperative nausea and vomiting), Psoriatic arthritis (Multi), Rotator cuff tear (2023), Vitamin D deficiency, and Wears glasses.    She has no past medical history of Personal history of irradiation.    Past Surgical History:  She has a  past surgical history that includes Joint replacement (Bilateral); Hysterectomy; Rotator cuff repair (Left); Colonoscopy (2018); Knee arthroscopy w/ meniscal repair (Right); Hernia repair; Foot Tendon Surgery (Bilateral); Nasal septum surgery; [place] tooth extraction; Right oophorectomy; and Oophorectomy (unsure).      Social History:  She reports that she has never smoked. She has never used smokeless tobacco. She reports that she does not drink alcohol and does not use drugs.    Family History:  Family History[1]     Allergies:  Benzocaine    Outpatient Medications:  Current Outpatient Medications   Medication Instructions    amoxicillin (Amoxil) 500 mg capsule Take 4 capsules by mouth 1 hour prior to procedure & 2 capsules 6 hours after procedure    B complex-vitamin C-folic acid (Nephro-Kaela Rx) 1- mg-mg-mcg tablet 1 tablet, Daily with breakfast    biotin 5 mg, Daily    cholecalciferol (VITAMIN D-3) 2,000 Units, 2 times daily    clindamycin (Cleocin) 300 mg capsule Take 2 capsules by mouth 1 hour prior to procedure and 1 capsule by mouth 6 hours post procedure. save remainder for future appointments    clotrimazole-betamethasone (Lotrisone) cream 1 Application, Topical, 2 times daily PRN    Eliquis 5 mg, oral, 2 times daily    EPINEPHrine (Epipen) 0.3 mg/0.3 mL injection syringe INJECT 1 SYRINGE INTRAMUSCULARLY AS NEEDED FOR SHORTNESS OF BREATH    flecainide (TAMBOCOR) 100 mg, oral, Daily    hydrOXYzine HCL (ATARAX) 10 mg, oral, Nightly    losartan (COZAAR) 100 mg, oral, Daily    meloxicam (MOBIC) 7.5 mg, oral, Daily    metoprolol succinate XL (TOPROL-XL) 25 mg, oral, Daily    multivitamin tablet 1 tablet, Daily    prochlorperazine (COMPAZINE) 10 mg, oral, Every 6 hours PRN    rosuvastatin (CRESTOR) 5 mg, oral, Daily        ROS:  A 14 point review of systems was done and is negative other than as stated in HPI    Physical Exam  Constitutional:       Appearance: Normal appearance.     Cardiovascular:       Rate and Rhythm: Normal rate and regular rhythm.      Heart sounds: No murmur heard.     No friction rub. No gallop.   Pulmonary:      Effort: Pulmonary effort is normal.      Breath sounds: Normal breath sounds.   Abdominal:      Palpations: Abdomen is soft.     Musculoskeletal:      Cervical back: Neck supple.     Neurological:      Mental Status: She is alert.     Psychiatric:         Mood and Affect: Mood normal.         Behavior: Behavior normal.         Vitals:  There were no vitals taken for this visit.       Labs:   CBC  Lab Results   Component Value Date    WBC 6.2 08/17/2024    HGB 12.9 08/17/2024    HCT 38.9 08/17/2024    MCV 92 08/17/2024     08/17/2024        Renal Function Panel  Lab Results   Component Value Date    GLUCOSE 108 (H) 03/28/2025     03/28/2025    K 4.2 03/28/2025     03/28/2025    CO2 25 03/28/2025    ANIONGAP 16 08/17/2024    BUN 25 03/28/2025    CREATININE 0.92 03/28/2025    GFRMALE CANCELED 04/26/2023    GFRF 87 05/26/2023    CALCIUM 9.2 03/28/2025        CMP  Lab Results   Component Value Date    CALCIUM 9.2 03/28/2025    PHOS 2.8 03/28/2025    PROT 7.2 08/17/2024    ALBUMIN 4.6 03/28/2025    AST 27 08/17/2024    ALT 27 08/17/2024    ALKPHOS 58 08/17/2024    BILITOT 0.7 08/17/2024       TSH  Lab Results   Component Value Date    TSH 1.40 03/04/2024    FREET4 1.03 02/15/2023          Cardiac Testing:  ECG  07/16/2025  sinus rhythm HR 63 bpm.     Echocardiogram  04/25/2023  PHYSICIAN INTERPRETATION:  Left Ventricle: The left ventricular systolic function is normal, with an estimated ejection fraction of 60-65%. There are no regional wall motion abnormalities. The left ventricular cavity size is normal. Spectral Doppler shows an impaired relaxation pattern of left ventricular diastolic filling.  Left Atrium: The left atrium is normal in size.  Right Ventricle: The right ventricle is normal in size. There is normal right ventricular global systolic function.  Right  Atrium: The right atrium is normal in size.  Aortic Valve: The aortic valve is trileaflet. There is mild aortic valve cusp calcification. There is no evidence of aortic valve stenosis.  There is no evidence of aortic valve regurgitation. The peak instantaneous gradient of the aortic valve is 12.4 mmHg. The mean gradient of the aortic valve is 7.6 mmHg.  Mitral Valve: The mitral valve is normal in structure. There is trace mitral valve regurgitation.  Tricuspid Valve: The tricuspid valve is structurally normal. There is trace tricuspid regurgitation. The Doppler estimated RVSP is within normal limits at 17.8 mmHg.  Pulmonic Valve: The pulmonic valve is structurally normal. There is trace to mild pulmonic valve regurgitation.  Pericardium: There is no pericardial effusion noted.  Aorta: The aortic root is normal.  Systemic Veins: The inferior vena cava appears to be of normal size. There is IVC inspiratory collapse greater than 50%.  In comparison to the previous echocardiogram(s): There are no prior studies on this patient for comparison purposes.        CONCLUSIONS:  1. Left ventricular systolic function is normal with a 60-65% estimated ejection fraction.  2. Spectral Doppler shows an impaired relaxation pattern of left ventricular diastolic filling.  3. RVSP within normal limits.    Nuclear Stress Test  12/05/2024  FINDINGS:  Both stress and rest studies demonstrate grossly normal perfusion  throughout the left ventricle.      The left ventricle is normal in size.      Gated images demonstrate normal LV wall motion with a post-stress LV  EF estimated at greater than 65%.      Attenuation correction CT images demonstrate no gross anatomic  abnormalities.      IMPRESSION:  No evidence of inducible myocardial ischemia or prior infarction.      The left ventricle is normal in size.      Normal LV wall motion with a post-stress LV EF estimated at greater  than 65%.      Holter Monitor  12/05/2024-12/19/2024  Patient  had a min HR of 43 bpm, max HR of 182 bpm, and avg HR of 66 bpm.  Predominant underlying rhythm was Sinus Rhythm. Slight P wave morphology  changes were noted. 1 run of Ventricular Tachycardia occurred lasting 5 beats with  a max rate of 132 bpm (avg 114 bpm). 84 Supraventricular Tachycardia runs  occurred, the run with the fastest interval lasting 12.8 secs with a max rate of 182  bpm (avg 144 bpm); the run with the fastest interval was also the longest.  Supraventricular Tachycardia was detected within +/- 45 seconds of symptomatic  patient event(s). Isolated SVEs were rare (<1.0%), SVE Couplets were rare (<1.0%),  and SVE Triplets were rare (<1.0%). Isolated VEs were occasional (2.0%, 78631), VE  Couplets were rare (<1.0%, 94), and no VE Triplets were present. Ventricular  Trigeminy was present. MD notification criteria for Ventricular Tachycardia met -  notified Clary RAYA on 26 Dec 2024 at 8:25 AM CST ([facility]).      Assessment/Plan:     Patient with history of afib and nonsustained SVT. Her monitor results showed sinus rhythm with heart rates 43- bpm, 1 episode of nonsustained VT and 84 episode of nonsustained SVT lasting up to 12 seconds.  No episodes of A-fib were seen. She was started on flecainide. She is here today for a follow up. She states that she has  not felt palpitations since she started flecainide. She still complains of SOB on exertion. Her ECG shows sinus rhythm HR 63 bpm. Will schedule a follow up.             [1]   Family History  Problem Relation Name Age of Onset    Lung cancer Mother      Dementia Mother      Breast cancer Sister

## 2025-07-14 DIAGNOSIS — I10 ESSENTIAL HYPERTENSION: ICD-10-CM

## 2025-07-14 PROCEDURE — RXMED WILLOW AMBULATORY MEDICATION CHARGE

## 2025-07-15 PROCEDURE — RXMED WILLOW AMBULATORY MEDICATION CHARGE

## 2025-07-15 RX ORDER — LOSARTAN POTASSIUM 100 MG/1
100 TABLET ORAL DAILY
Qty: 90 TABLET | Refills: 3 | Status: SHIPPED | OUTPATIENT
Start: 2025-07-15 | End: 2026-07-15

## 2025-07-15 RX ORDER — METOPROLOL SUCCINATE 25 MG/1
25 TABLET, EXTENDED RELEASE ORAL DAILY
Qty: 90 TABLET | Refills: 3 | Status: SHIPPED | OUTPATIENT
Start: 2025-07-15 | End: 2026-07-15

## 2025-07-16 ENCOUNTER — APPOINTMENT (OUTPATIENT)
Dept: CARDIOLOGY | Facility: CLINIC | Age: 65
End: 2025-07-16
Payer: COMMERCIAL

## 2025-07-16 VITALS
BODY MASS INDEX: 38.25 KG/M2 | OXYGEN SATURATION: 97 % | RESPIRATION RATE: 16 BRPM | WEIGHT: 259 LBS | HEART RATE: 64 BPM | SYSTOLIC BLOOD PRESSURE: 110 MMHG | DIASTOLIC BLOOD PRESSURE: 62 MMHG

## 2025-07-16 DIAGNOSIS — R00.2 PALPITATIONS: Primary | ICD-10-CM

## 2025-07-16 DIAGNOSIS — I48.91 ATRIAL FIBRILLATION, UNSPECIFIED TYPE (MULTI): ICD-10-CM

## 2025-07-16 DIAGNOSIS — I47.10 NONSUSTAINED PAROXYSMAL SUPRAVENTRICULAR TACHYCARDIA: ICD-10-CM

## 2025-07-16 ASSESSMENT — LIFESTYLE VARIABLES
HOW OFTEN DO YOU HAVE A DRINK CONTAINING ALCOHOL: NEVER
HAVE YOU OR SOMEONE ELSE BEEN INJURED AS A RESULT OF YOUR DRINKING: NO
HOW OFTEN DO YOU HAVE SIX OR MORE DRINKS ON ONE OCCASION: NEVER
HOW MANY STANDARD DRINKS CONTAINING ALCOHOL DO YOU HAVE ON A TYPICAL DAY: PATIENT DOES NOT DRINK
SKIP TO QUESTIONS 9-10: 1
AUDIT-C TOTAL SCORE: 0
AUDIT TOTAL SCORE: 0
HAS A RELATIVE, FRIEND, DOCTOR, OR ANOTHER HEALTH PROFESSIONAL EXPRESSED CONCERN ABOUT YOUR DRINKING OR SUGGESTED YOU CUT DOWN: NO

## 2025-07-16 ASSESSMENT — PATIENT HEALTH QUESTIONNAIRE - PHQ9
SUM OF ALL RESPONSES TO PHQ9 QUESTIONS 1 AND 2: 0
1. LITTLE INTEREST OR PLEASURE IN DOING THINGS: NOT AT ALL
2. FEELING DOWN, DEPRESSED OR HOPELESS: NOT AT ALL

## 2025-07-16 ASSESSMENT — ENCOUNTER SYMPTOMS
OCCASIONAL FEELINGS OF UNSTEADINESS: 0
DEPRESSION: 0
LOSS OF SENSATION IN FEET: 0

## 2025-07-16 ASSESSMENT — PAIN SCALES - GENERAL: PAINLEVEL_OUTOF10: 0-NO PAIN

## 2025-07-17 ENCOUNTER — PHARMACY VISIT (OUTPATIENT)
Dept: PHARMACY | Facility: CLINIC | Age: 65
End: 2025-07-17
Payer: COMMERCIAL

## 2025-07-17 ENCOUNTER — APPOINTMENT (OUTPATIENT)
Dept: UROLOGY | Facility: CLINIC | Age: 65
End: 2025-07-17
Payer: COMMERCIAL

## 2025-07-17 DIAGNOSIS — R30.0 DYSURIA: Primary | ICD-10-CM

## 2025-07-17 NOTE — PROGRESS NOTES
Virtual or Telephone Consent    While technically available, the patient was unable or unwilling to consent to connect via audio/video telehealth technology; therefore, I performed this visit using a real-time audio only connection between Melany Garcia & Michael Campos MD.  Verbal consent was requested and obtained from Melany Garcia on this date, 07/21/25 for a telehealth visit and the patient's location was confirmed at the time of the visit.    HISTORY OF PRESENT ILLNESS:  Melany Garcia is a 64 y.o. female who presents today for a virtual follow up visit. She would like to do another injection in August as she is scheduled. Her symptoms started back up her last visit.  She does not know if she has a UTI but she does have increased urgency and incontinence.          Past Medical History  She has a past medical history of A-fib (Multi), Acute urinary tract infection (11/28/2023), Adverse reaction to drug (11/28/2023), Allergic reaction (11/28/2023), Anemia, Ankylosing spondylitis, Breast injury (not sure), Delayed emergence from general anesthesia, Diabetes mellitus (Multi), Fever (11/28/2023), Genetic testing (2021), Gout, History of abnormal cervical Pap smear, History of blood transfusion, Hyperlipidemia, Hypertension, Memory deficit, OA (osteoarthritis), RALPH on CPAP, PONV (postoperative nausea and vomiting), Psoriatic arthritis (Multi), Rotator cuff tear (11/28/2023), Vitamin D deficiency, and Wears glasses.    She has no past medical history of Personal history of irradiation.    Surgical History  She has a past surgical history that includes Joint replacement (Bilateral); Hysterectomy; Rotator cuff repair (Left); Colonoscopy (2018); Knee arthroscopy w/ meniscal repair (Right); Hernia repair; Foot Tendon Surgery (Bilateral); Nasal septum surgery; [place] tooth extraction; Right oophorectomy; and Oophorectomy (unsure).     Social History  She reports that she has never smoked. She has never used  smokeless tobacco. She reports that she does not drink alcohol and does not use drugs.    Family History  Family History[1]     Allergies  Benzocaine      A comprehensive 10+ review of systems was negative except for: see hpi                  Assessment:  63 yo with with OAB    OAB:  -has failed multiple AC meds including vesicare and oxybutynin   -UDS shows detrusor overactivity and normal emptying, no evidence of obstruction  -Negative cystoscopy 5/13/2024  -S/P botox, 100 units, 2/24/25, % better  -Sx returning, will do another botox injection   -Will do a urine culture       Follow up as scheduled for botox        I spent a total of 20 minutes speaking with the patient on the telephone     All questions and concerns were answered and addressed.  The patient expressed understanding and agrees with the plan.     Michael Campos MD    Scribe Attestation  By signing my name below, I, Graciela De La Garza, Scribjose manuel   attest that this documentation has been prepared under the direction and in the presence of Michael Campos MD.         [1]   Family History  Problem Relation Name Age of Onset    Lung cancer Mother      Dementia Mother      Breast cancer Sister

## 2025-07-18 ENCOUNTER — LAB (OUTPATIENT)
Dept: LAB | Facility: HOSPITAL | Age: 65
End: 2025-07-18
Payer: COMMERCIAL

## 2025-07-18 DIAGNOSIS — R30.0 DYSURIA: ICD-10-CM

## 2025-07-18 DIAGNOSIS — E78.49 OTHER HYPERLIPIDEMIA: ICD-10-CM

## 2025-07-18 DIAGNOSIS — E11.9 TYPE 2 DIABETES MELLITUS WITHOUT COMPLICATION, UNSPECIFIED WHETHER LONG TERM INSULIN USE: ICD-10-CM

## 2025-07-18 LAB
CHOLEST SERPL-MCNC: 144 MG/DL (ref 0–199)
CHOLESTEROL/HDL RATIO: 3.5
EST. AVERAGE GLUCOSE BLD GHB EST-MCNC: 140 MG/DL
HBA1C MFR BLD: 6.5 % (ref ?–5.7)
HDLC SERPL-MCNC: 41.2 MG/DL
LDLC SERPL CALC-MCNC: 76 MG/DL
NON HDL CHOLESTEROL: 103 MG/DL (ref 0–149)
TRIGL SERPL-MCNC: 136 MG/DL (ref 0–149)
VLDL: 27 MG/DL (ref 0–40)

## 2025-07-21 DIAGNOSIS — R30.0 DYSURIA: ICD-10-CM

## 2025-07-22 DIAGNOSIS — R30.0 DYSURIA: ICD-10-CM

## 2025-07-23 DIAGNOSIS — R30.0 DYSURIA: ICD-10-CM

## 2025-07-24 DIAGNOSIS — R30.0 DYSURIA: ICD-10-CM

## 2025-07-25 DIAGNOSIS — R30.0 DYSURIA: ICD-10-CM

## 2025-07-25 LAB
BACTERIA UR CULT: NORMAL
COLOR UR: NORMAL

## 2025-07-28 DIAGNOSIS — R30.0 DYSURIA: ICD-10-CM

## 2025-07-29 DIAGNOSIS — R30.0 DYSURIA: ICD-10-CM

## 2025-07-30 DIAGNOSIS — R30.0 DYSURIA: ICD-10-CM

## 2025-07-31 ENCOUNTER — APPOINTMENT (OUTPATIENT)
Dept: CARDIOLOGY | Facility: HOSPITAL | Age: 65
End: 2025-07-31
Payer: COMMERCIAL

## 2025-07-31 DIAGNOSIS — R30.0 DYSURIA: ICD-10-CM

## 2025-07-31 LAB
BACTERIA UR CULT: ABNORMAL
COLOR UR: NORMAL

## 2025-08-01 DIAGNOSIS — N39.0 ACUTE UTI: ICD-10-CM

## 2025-08-01 DIAGNOSIS — R30.0 DYSURIA: ICD-10-CM

## 2025-08-01 RX ORDER — NITROFURANTOIN 25; 75 MG/1; MG/1
100 CAPSULE ORAL 2 TIMES DAILY
Qty: 10 CAPSULE | Refills: 0 | Status: SHIPPED | OUTPATIENT
Start: 2025-08-01 | End: 2025-08-06

## 2025-08-08 ENCOUNTER — SPECIALTY PHARMACY (OUTPATIENT)
Dept: PHARMACY | Facility: CLINIC | Age: 65
End: 2025-08-08

## 2025-08-08 DIAGNOSIS — N32.81 OAB (OVERACTIVE BLADDER): ICD-10-CM

## 2025-08-08 PROCEDURE — RXMED WILLOW AMBULATORY MEDICATION CHARGE

## 2025-08-09 ENCOUNTER — PHARMACY VISIT (OUTPATIENT)
Dept: PHARMACY | Facility: CLINIC | Age: 65
End: 2025-08-09
Payer: COMMERCIAL

## 2025-08-14 ENCOUNTER — APPOINTMENT (OUTPATIENT)
Dept: UROLOGY | Facility: CLINIC | Age: 65
End: 2025-08-14
Payer: COMMERCIAL

## 2025-08-14 ENCOUNTER — PHARMACY VISIT (OUTPATIENT)
Dept: PHARMACY | Facility: CLINIC | Age: 65
End: 2025-08-14
Payer: COMMERCIAL

## 2025-08-14 DIAGNOSIS — N32.81 OAB (OVERACTIVE BLADDER): ICD-10-CM

## 2025-08-14 DIAGNOSIS — R39.9 URINARY SYMPTOM OR SIGN: Primary | ICD-10-CM

## 2025-08-14 DIAGNOSIS — N39.0 RECURRENT UTI: ICD-10-CM

## 2025-08-14 LAB
POC APPEARANCE, URINE: CLEAR
POC BILIRUBIN, URINE: NEGATIVE
POC BLOOD, URINE: NEGATIVE
POC COLOR, URINE: YELLOW
POC GLUCOSE, URINE: NEGATIVE MG/DL
POC KETONES, URINE: NEGATIVE MG/DL
POC LEUKOCYTES, URINE: ABNORMAL
POC NITRITE,URINE: NEGATIVE
POC PH, URINE: 5.5 PH
POC PROTEIN, URINE: NEGATIVE MG/DL
POC SPECIFIC GRAVITY, URINE: 1.02
POC UROBILINOGEN, URINE: 0.2 EU/DL

## 2025-08-14 PROCEDURE — RXMED WILLOW AMBULATORY MEDICATION CHARGE

## 2025-08-14 PROCEDURE — 81002 URINALYSIS NONAUTO W/O SCOPE: CPT | Performed by: STUDENT IN AN ORGANIZED HEALTH CARE EDUCATION/TRAINING PROGRAM

## 2025-08-14 PROCEDURE — 52287 CYSTOSCOPY CHEMODENERVATION: CPT | Performed by: STUDENT IN AN ORGANIZED HEALTH CARE EDUCATION/TRAINING PROGRAM

## 2025-08-14 RX ORDER — ESTRADIOL 0.1 MG/G
CREAM VAGINAL
Qty: 42.5 G | Refills: 12 | Status: SHIPPED | OUTPATIENT
Start: 2025-08-14

## 2025-08-14 RX ORDER — ASCORBIC ACID 500 MG
250 TABLET ORAL 2 TIMES DAILY
Qty: 30 TABLET | Refills: 11 | Status: SHIPPED | OUTPATIENT
Start: 2025-08-14 | End: 2026-08-14

## 2025-08-14 RX ORDER — INDOMETHACIN 25 MG/1
10 CAPSULE ORAL ONCE
Status: COMPLETED | OUTPATIENT
Start: 2025-08-14 | End: 2025-08-14

## 2025-08-14 RX ORDER — METHENAMINE HIPPURATE 1000 MG/1
1 TABLET ORAL 2 TIMES DAILY
Qty: 180 TABLET | Refills: 3 | Status: SHIPPED | OUTPATIENT
Start: 2025-08-14 | End: 2026-08-14

## 2025-08-14 RX ORDER — LIDOCAINE HYDROCHLORIDE 10 MG/ML
50 INJECTION, SOLUTION INFILTRATION; PERINEURAL ONCE
Status: COMPLETED | OUTPATIENT
Start: 2025-08-14 | End: 2025-08-14

## 2025-08-14 RX ORDER — NITROFURANTOIN 25; 75 MG/1; MG/1
100 CAPSULE ORAL 2 TIMES DAILY
Qty: 6 CAPSULE | Refills: 0 | Status: SHIPPED | OUTPATIENT
Start: 2025-08-14 | End: 2025-08-17

## 2025-08-14 RX ADMIN — LIDOCAINE HYDROCHLORIDE 50 ML: 10 INJECTION, SOLUTION INFILTRATION; PERINEURAL at 10:26

## 2025-08-14 RX ADMIN — INDOMETHACIN 10 MEQ: 25 CAPSULE ORAL at 10:26

## 2025-08-15 DIAGNOSIS — N39.0 RECURRENT UTI: ICD-10-CM

## 2025-08-18 DIAGNOSIS — N39.0 RECURRENT UTI: ICD-10-CM

## 2025-08-19 DIAGNOSIS — N39.0 RECURRENT UTI: ICD-10-CM

## 2025-08-20 DIAGNOSIS — N39.0 RECURRENT UTI: ICD-10-CM

## 2025-08-21 DIAGNOSIS — N39.0 RECURRENT UTI: ICD-10-CM

## 2025-08-22 DIAGNOSIS — N39.0 RECURRENT UTI: ICD-10-CM

## 2025-08-25 DIAGNOSIS — N39.0 RECURRENT UTI: ICD-10-CM

## 2025-08-25 PROCEDURE — RXMED WILLOW AMBULATORY MEDICATION CHARGE

## 2025-08-26 DIAGNOSIS — N39.0 RECURRENT UTI: ICD-10-CM

## 2025-08-27 DIAGNOSIS — N39.0 RECURRENT UTI: ICD-10-CM

## 2025-08-28 ENCOUNTER — PHARMACY VISIT (OUTPATIENT)
Dept: PHARMACY | Facility: CLINIC | Age: 65
End: 2025-08-28
Payer: COMMERCIAL

## 2025-08-28 DIAGNOSIS — N39.0 RECURRENT UTI: ICD-10-CM

## 2025-08-29 DIAGNOSIS — N39.0 RECURRENT UTI: ICD-10-CM

## 2025-09-25 ENCOUNTER — APPOINTMENT (OUTPATIENT)
Dept: UROLOGY | Facility: CLINIC | Age: 65
End: 2025-09-25
Payer: COMMERCIAL

## 2026-01-21 ENCOUNTER — APPOINTMENT (OUTPATIENT)
Dept: CARDIOLOGY | Facility: CLINIC | Age: 66
End: 2026-01-21
Payer: COMMERCIAL

## (undated) DEVICE — BUR, OVAL, STERLING, HIGH SPEED, 6 MM

## (undated) DEVICE — COUNTER, NEEDLE, FOAM STRIP, DOUBLE, W/BLADEGUARD, 30 COUNT

## (undated) DEVICE — Device

## (undated) DEVICE — SUTURE, PROLENE, 3-0, 18 IN, PS2, BLUE

## (undated) DEVICE — SPONGE, GAUZE, XRAY DECT, 16 PLY, 4 X 4, W/MASTER DMT,STERILE

## (undated) DEVICE — DRESSING, ABDOMINAL, TENDERSORB, 8 X 10 IN, STERILE

## (undated) DEVICE — APPLICATOR, CHLORAPREP, W/ORANGE TINT, 26ML

## (undated) DEVICE — CAUTERY, PENCIL, PUSH BUTTON, SMOKE EVAC, 70MM

## (undated) DEVICE — BANDAGE, ELASTIC, MATRIX, SELF-CLOSURE, 6 IN X 5 YD, LF

## (undated) DEVICE — SUTURE SYSTEM, EXPRESSEW III NEEDLES

## (undated) DEVICE — TUBING, ARTHROSCOPIC INFLOW, 10K

## (undated) DEVICE — COVER HANDLE LIGHT, STERIS, BLUE, STERILE

## (undated) DEVICE — TUBING, SUCTION, CONNECTING, NON-CONDUCTIVE, SURE GRIP CONNECTORS, 3/16 IN X 10 FT

## (undated) DEVICE — DRESSING, GAUZE, SPONGE, KERLIX, SUPER, 6 X 6.75 IN, STERILE 10PK

## (undated) DEVICE — NEEDLE, SPINAL, QUINCKE, 18 G X 3.5 IN, PINK HUB

## (undated) DEVICE — DRESSING, NON-ADHERENT, 3 X 3 IN, STERILE

## (undated) DEVICE — BANDAGE, COFLEX, 4 X 5 YDS, TAN, STERILE, LF

## (undated) DEVICE — MARKER, SKIN, DUAL TIP INK W/9 LABEL AND REMOVABLE TIME OUT SLEEVE

## (undated) DEVICE — TOWEL PACK, STERILE, 4/PACK, BLUE

## (undated) DEVICE — DRAPE PACK, ORTHOPEDIC, SHOULDER, LF, STERILE

## (undated) DEVICE — DRAPE, SHEET, U, STERI DRAPE, 47 X 51 IN, DISPOSABLE, STERILE

## (undated) DEVICE — TUBING, SUCTION, NON-CONDUCTIVE, W/CONNECT,.25 IN X 12 FT, STERILE, LF

## (undated) DEVICE — GOWN, ASTOUND, L